# Patient Record
Sex: MALE | Race: OTHER | HISPANIC OR LATINO | Employment: UNEMPLOYED | URBAN - METROPOLITAN AREA
[De-identification: names, ages, dates, MRNs, and addresses within clinical notes are randomized per-mention and may not be internally consistent; named-entity substitution may affect disease eponyms.]

---

## 2017-01-16 ENCOUNTER — OFFICE VISIT (OUTPATIENT)
Dept: URGENT CARE | Facility: MEDICAL CENTER | Age: 47
End: 2017-01-16
Payer: COMMERCIAL

## 2017-01-16 PROCEDURE — G0383 LEV 4 HOSP TYPE B ED VISIT: HCPCS

## 2017-01-16 PROCEDURE — 94640 AIRWAY INHALATION TREATMENT: CPT

## 2017-08-07 ENCOUNTER — HOSPITAL ENCOUNTER (EMERGENCY)
Facility: HOSPITAL | Age: 47
Discharge: HOME | End: 2017-08-07
Admitting: EMERGENCY MEDICINE

## 2017-08-07 VITALS
TEMPERATURE: 97.9 F | DIASTOLIC BLOOD PRESSURE: 85 MMHG | HEART RATE: 81 BPM | RESPIRATION RATE: 16 BRPM | SYSTOLIC BLOOD PRESSURE: 128 MMHG | OXYGEN SATURATION: 97 %

## 2017-08-07 DIAGNOSIS — M54.5 BILATERAL LOW BACK PAIN, UNSPECIFIED CHRONICITY, WITH SCIATICA PRESENCE UNSPECIFIED: Primary | ICD-10-CM

## 2017-08-07 PROCEDURE — 99283 EMERGENCY DEPT VISIT LOW MDM: CPT

## 2017-08-07 RX ORDER — METHYLPREDNISOLONE 4 MG/1
TABLET ORAL
Qty: 21 TABLET | Refills: 0 | Status: SHIPPED | OUTPATIENT
Start: 2017-08-07 | End: 2018-10-13

## 2017-08-07 RX ORDER — CYCLOBENZAPRINE HCL 5 MG
TABLET ORAL
Qty: 12 TABLET | Refills: 0 | Status: SHIPPED | OUTPATIENT
Start: 2017-08-07 | End: 2018-10-13

## 2017-08-07 RX ORDER — LIDOCAINE 50 MG/G
1 PATCH TOPICAL ONCE
Status: DISCONTINUED | OUTPATIENT
Start: 2017-08-07 | End: 2017-08-07 | Stop reason: HOSPADM

## 2017-08-07 RX ADMIN — LIDOCAINE 1 PATCH: 50 PATCH CUTANEOUS at 07:35

## 2017-08-15 ENCOUNTER — ALLSCRIPTS OFFICE VISIT (OUTPATIENT)
Dept: OTHER | Facility: OTHER | Age: 47
End: 2017-08-15

## 2017-08-24 ENCOUNTER — ALLSCRIPTS OFFICE VISIT (OUTPATIENT)
Dept: OTHER | Facility: OTHER | Age: 47
End: 2017-08-24

## 2017-09-25 ENCOUNTER — GENERIC CONVERSION - ENCOUNTER (OUTPATIENT)
Dept: OTHER | Facility: OTHER | Age: 47
End: 2017-09-25

## 2017-10-02 ENCOUNTER — GENERIC CONVERSION - ENCOUNTER (OUTPATIENT)
Dept: FAMILY MEDICINE CLINIC | Facility: CLINIC | Age: 47
End: 2017-10-02

## 2017-10-04 ENCOUNTER — GENERIC CONVERSION - ENCOUNTER (OUTPATIENT)
Dept: OTHER | Facility: OTHER | Age: 47
End: 2017-10-04

## 2017-11-10 ENCOUNTER — GENERIC CONVERSION - ENCOUNTER (OUTPATIENT)
Dept: OTHER | Facility: OTHER | Age: 47
End: 2017-11-10

## 2017-11-20 ENCOUNTER — GENERIC CONVERSION - ENCOUNTER (OUTPATIENT)
Dept: OTHER | Facility: OTHER | Age: 47
End: 2017-11-20

## 2017-11-29 ENCOUNTER — GENERIC CONVERSION - ENCOUNTER (OUTPATIENT)
Dept: OTHER | Facility: OTHER | Age: 47
End: 2017-11-29

## 2017-12-07 ENCOUNTER — GENERIC CONVERSION - ENCOUNTER (OUTPATIENT)
Dept: OTHER | Facility: OTHER | Age: 47
End: 2017-12-07

## 2017-12-07 DIAGNOSIS — M25.511 PAIN IN RIGHT SHOULDER: ICD-10-CM

## 2018-01-12 VITALS
OXYGEN SATURATION: 98 % | SYSTOLIC BLOOD PRESSURE: 112 MMHG | HEIGHT: 65 IN | HEART RATE: 94 BPM | WEIGHT: 144.06 LBS | DIASTOLIC BLOOD PRESSURE: 80 MMHG | RESPIRATION RATE: 18 BRPM | BODY MASS INDEX: 24 KG/M2 | TEMPERATURE: 96.4 F

## 2018-01-12 NOTE — MISCELLANEOUS
Reason For Visit  Reason For Visit Free Text Note Form: SW met with the PT in regards to his current needs  PT states he has lost his employment and currently has no income  SW gave the PT information to go directly to the [facility] office to apply for Medical Assistance and Food stamps  SW provided PT list of what is needed for this application  PT will follow-up with SW for future assistance if needed  Active Problems    1  Acute sinusitis (461 9) (J01 90)   2  Bilateral carpal tunnel syndrome (354 0) (G56 03)   3  Cough (786 2) (R05)   4  Depression (311) (F32 9)   5  Depression screening (V79 0) (Z13 89)   6  Generalized abdominal pain (789 07) (R10 84)   7  GERD (gastroesophageal reflux disease) (530 81) (K21 9)   8  Hyperlipidemia (272 4) (E78 5)   9  Increased frequency of urination (788 41) (R35 0)   10  Increased thirst (783 5) (R63 1)   11  Insomnia (780 52) (G47 00)   12  Loose stools (787 7) (R19 5)   13  Lower back pain (724 2) (M54 5)   14  Lumbar radiculopathy (724 4) (M54 16)   15  Pain in both wrists (719 43) (M25 531,M25 532)   16  Vitamin D deficiency (268 9) (E55 9)    Current Meds   1  Imodium A-D 2 MG Oral Tablet; TAKE 2 TABLETS INITIALLY, FOLLOWED BY 1 TABLET   AFTER EACH LOOSE BOWEL MOVEMENT  DO NOT EXCEED 8 TABLETS/DAY; Therapy: 14SLI2034 to (Last Rx:20Nov2017) Ordered   2  Omeprazole 20 MG Oral Tablet Delayed Release; TAKE ONE TABLET AT BEDTIME; Therapy: 47UTW2736 to (Last Rx:20Nov2017) Ordered   3  Sertraline HCl - 100 MG Oral Tablet; TAKE 1 TABLET DAILY; Therapy: 17Nme5712 to (Evaluate:31Mar2018)  Requested for: 02Oct2017; Last   Rx:02Oct2017 Ordered   4  Zolpidem Tartrate 10 MG Oral Tablet (Ambien); TAKE 1 TABLET AT BEDTIME AS NEEDED   FOR SLEEP; Therapy: 72Brf2780 to (Evaluate:67Jux6719); Last Rx:60Vag4667 Ordered    Allergies    1   No Known Drug Allergies    Signatures   Electronically signed by : Brigid Isaac; Nov 29 2017 10:57AM EST (Author)

## 2018-01-12 NOTE — MISCELLANEOUS
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 and cannot afford what is offered through the [employer]  SW discussed the possibility of applying for MAWD (Medical Assistance for Workers with Disabilities), which carries a higher income limit  PT is going to return on 10/11/17 to complete MAWD application and supply application with the required documentation  Active Problems    1  Bilateral carpal tunnel syndrome (354 0) (G56 03)   2  Depression (311) (F32 9)   3  Depression screening (V79 0) (Z13 89)   4  GERD (gastroesophageal reflux disease) (530 81) (K21 9)   5  Hyperlipidemia (272 4) (E78 5)   6  Increased frequency of urination (788 41) (R35 0)   7  Increased thirst (783 5) (R63 1)   8  Insomnia (780 52) (G47 00)   9  Lower back pain (724 2) (M54 5)   10  Lumbar radiculopathy (724 4) (M54 16)   11  Pain in both wrists (719 43) (M25 531,M25 532)   12  Vitamin D deficiency (268 9) (E55 9)    Current Meds   1  Sertraline HCl - 100 MG Oral Tablet; TAKE 1 TABLET DAILY; Therapy: 49Lcn1629 to (Evaluate:31Mar2018)  Requested for: 02Oct2017; Last   Rx:02Oct2017 Ordered   2  Zolpidem Tartrate 10 MG Oral Tablet (Ambien); TAKE 1 TABLET AT BEDTIME AS NEEDED   FOR SLEEP; Therapy: 42Gwi4002 to (Last Rx:81Kqa2450) Ordered    Allergies    1   No Known Drug Allergies    Signatures   Electronically signed by : Trish Naylor [facility]; Oct  6 2017 10:44AM EST                       (Author)

## 2018-01-12 NOTE — PROGRESS NOTES
Assessment    1  Bilateral carpal tunnel syndrome (354 0) (G56 01,G56 02)   2  Subacromial bursitis (726 19) (M75 50)    Plan  Subacromial bursitis    · Apply an ice pack as needed for pain twice a day for 20 minutes ; Status:Complete;    Done: 51QRF6708   · Follow-up PRN Evaluation and Treatment  Follow-up  Status: Complete  Done:  23CHC6514   · Home Exercise Program Evaluation and Treatment  Follow-up  Status: Complete  Done:  10KIU9268    Discussion/Summary    Assessment  #1 status post bilateral carpal tunnel releases  #2 bilateral shoulder subacromial bursitis  #3 lumbago    Plan  #1  He will continue home stretching and strengthening exercises for his rotator cuff  I reviewed a home exercises for a lumbar program   #2 ice, anti-inflammatory currently using diclofenac  #3 follow-up when necessary call if symptoms worsen  The treatment plan was reviewed with the patient/guardian  The patient/guardian understands and agrees with the treatment plan      Chief Complaint    1  Hand Problem    History of Present Illness  HPI: 79-year-old male here for follow-up from his right and left endoscopic carpal tunnel release on 11/16/15 and 12/10/15 respectively  Since the last visit he had been participating in home rotator cuff strengthening exercises  He had also had a fall onto his left side on an outstretched hand  He complains of tingling in the posterior aspect of the left hand involving all the digits  He also complains of low back pain  I have personally reviewed and updated the patient's past medical history, past surgical history, family history, social history, current medications, allergies, and vital signs today  Review of Systems    Constitutional: No fever or chills, feels well, no tiredness, no recent weight loss or weight gain  Eyes: No complaints of red eyes, no eyesight problems  ENT: no complaints of loss of hearing, no nosebleeds, no sore throat     Cardiovascular: No complaints of chest pain, no palpitations, no leg claudication or lower extremity edema  Respiratory: No complaints of shortness of breath, no wheezing, no cough  Gastrointestinal: No complaints of abdominal pain, no constipation, no nausea or vomiting, no diarrhea or bloody stools  Genitourinary: No complaints of dysuria or incontinence, no hesitancy, no nocturia  Musculoskeletal: as noted in HPI  Integumentary: No complaints of skin rash or lesion, no itching or dry skin, no skin wounds  Neurological: No complaints of headache, no confusion, no numbness or tingling, no dizziness  Psychiatric: No suicidal thoughts, no anxiety, no depression  Endocrine: No muscle weakness, no frequent urination, no excessive thirst, no feelings of weakness  ROS reviewed  Active Problems    1  Acute upper respiratory infection (465 9) (J06 9)   2  Bilateral carpal tunnel syndrome (354 0) (G56 01,G56 02)   3  Bilateral hand numbness (782 0) (R20 0)   4  Chest pain (786 50) (R07 9)   5  Depression (311) (F32 9)   6  Fatigue (780 79) (R53 83)   7  Generalized anxiety disorder (300 02) (F41 1)   8  Hyperlipidemia (272 4) (E78 5)   9  Lower back pain (724 2) (M54 5)   10  Nummular dermatitis (692 9) (L30 0)   11  Pain in both wrists (719 43) (M25 531,M25 532)   12  Subacromial bursitis (726 19) (M75 50)   13  Vitamin D deficiency (268 9) (E55 9)   14  Wrist pain (719 43) (M25 539)    Current Meds   1  Atorvastatin Calcium 20 MG Oral Tablet; TAKE 1 TABLET DAILY AT BEDTIME; Therapy: 86CNY4891 to (Evaluate:70Mft4996); Last Rx:21Jan2014 Ordered   2  Gabapentin 300 MG Oral Capsule; Take 1 capsule at night for 3 days, then take 1   capsule in morning and night x 3 days, then use every 8 hours; Therapy: 26UJZ1825 to (Evaluate:74Rfl6851)  Requested for: 98FVC6555; Last   Rx:25Jun2015 Ordered   3  Hydrocortisone 2 5 % External Cream; APPLY SPARINGLY TO AFFECTED AREA(S) 2   TO 3 TIMES DAILY   DO NOT USE ON GENITAL AREA, FACE OR BREASTS; Therapy: 46DQA5455 to (Last Rx:77Mdv0842) Ordered   4  Meloxicam 15 MG Oral Tablet; TAKE 1 TABLET DAILY AS NEEDED; Therapy: 20Gwo1999 to (Evaluate:23Jun2014)  Requested for: 60Rum2916; Last   Rx:33Qhh9306 Ordered   5  Metaxalone 800 MG Oral Tablet; TAKE 1 TABLET 3 TIMES DAILY AS NEEDED FOR   MUSCLE SPASM; Therapy: 65Hea1980 to (Juana Carlisle)  Requested for: 24Apr2014; Last   Rx:24Apr2014 Ordered   6  Multiple Vitamins Oral Tablet; Therapy: 83QDB2314 to Recorded   7  PARoxetine HCl - 20 MG Oral Tablet; take 1 tablet q hs;   Therapy: 18UOO4979 to (Evaluate:18Jan2014); Last Rx:31Kor4715 Ordered   8  TraZODone HCl - 50 MG Oral Tablet; TAKE 1/2 - 4 TABLETS AT BEDTIME AS NEEDED   FOR SLEEP; Therapy: 26NES1442 to (Last Rx:05Pfw7922) Ordered   9  Vitamin D (Ergocalciferol) 09572 UNIT Oral Capsule; TAKE 1 CAPSULE WEEKLY; Therapy: 92FRO6396 to (Last Rx:37Wpu1039) Ordered    Allergies    1  No Known Drug Allergies    Vitals  Signs [Data Includes: Current Encounter]    Heart Rate: 77  Systolic: 694  Diastolic: 79  Height: 5 ft 4 in  Weight: 145 lb 2 08 oz  BMI Calculated: 24 91  BSA Calculated: 1 72    Physical Exam  (Full range of motion intact to the bilateral shoulders  Rotator cuff strength 5 out of 5 in all fields minimally tender to the subacromial bursa  Positive Neer, negative Vargas)    (Negative Tinel at the median and ulnar nerve  Negative carpal and cubital tunnel compression incisions are well-healed    Full wrist range of motion is intact)    (Negative Spurling)      Constitutional - General appearance: Normal    Musculoskeletal - Gait and station: Normal  Digits and nails: Normal  Muscle strength/tone: Normal    Cardiovascular - Pulses: Normal  Examination of extremities for edema and/or varicosities: Normal    Skin - Skin and subcutaneous tissue: Normal    Neurologic - Sensation: Normal    Psychiatric - Orientation to person, place, and time: Normal  Mood and affect: Normal    Eyes Conjunctiva and lids: Normal     Pupils and irises: Normal        Attending Note  Collaborating Physician Note: Collaborating Physician: I interviewed and examined the patient, I supervised the Advanced Practitioner and I agree with the Advanced Practitioner note        Signatures   Electronically signed by : Henretta Aschoff, [facility]; Feb 2 2016  4:32PM EST                       (Author)    Electronically signed by : JOSEMANUEL Stoddard ; Feb 2 2016  4:58PM EST                       (Author)

## 2018-01-13 VITALS
SYSTOLIC BLOOD PRESSURE: 120 MMHG | BODY MASS INDEX: 24.32 KG/M2 | HEIGHT: 65 IN | DIASTOLIC BLOOD PRESSURE: 78 MMHG | RESPIRATION RATE: 20 BRPM | TEMPERATURE: 97.2 F | WEIGHT: 146 LBS | OXYGEN SATURATION: 99 % | HEART RATE: 91 BPM

## 2018-01-13 NOTE — MISCELLANEOUS
Message  Left message for patient regarding lab results essentially normal  To call back or schedule follow up visit if he has started omeprazole and is still not feeling well        Signatures   Electronically signed by : Cynthia Wolfe, [address]; Feb 25 2016 10:33AM EST                       (Author)

## 2018-01-15 NOTE — MISCELLANEOUS
Message  Return to work or school:   Mateo Hanley is under my professional care  He was seen in my office on 2/2/16     He can perform work without limitations  Jacqueline Hoffmann PA-C        Signatures   Electronically signed by : José Rodriguez, [facility]; Feb  3 2016  1:23PM EST                       (Author)

## 2018-01-16 ENCOUNTER — APPOINTMENT (OUTPATIENT)
Dept: RADIOLOGY | Facility: CLINIC | Age: 48
End: 2018-01-16
Payer: COMMERCIAL

## 2018-01-16 ENCOUNTER — GENERIC CONVERSION - ENCOUNTER (OUTPATIENT)
Dept: OTHER | Facility: OTHER | Age: 48
End: 2018-01-16

## 2018-01-16 DIAGNOSIS — M54.2 CERVICALGIA: ICD-10-CM

## 2018-01-16 DIAGNOSIS — M25.511 PAIN IN RIGHT SHOULDER: ICD-10-CM

## 2018-01-16 PROCEDURE — 72040 X-RAY EXAM NECK SPINE 2-3 VW: CPT

## 2018-01-16 NOTE — MISCELLANEOUS
Please contact our office at your convenience  It is important that we speak to you  REGARDING:   Porfavor contacte a nuestra oficina  Es muy  importante que hablemos con usted   SOBRE:         X Scheduling an Appointment/ Programar jaylon Alise          Rescheduling an Appointment/ Re-programar  jaylon Alise     Cancelling an Appointment/Cancelar wahl Alise     Your Lab/ X-ray Results/Resultados         Updating your Phone number or Address/ Actualizar wahl [employer]            Verify your Primary Providor/ Verificar wahl Proveedor primario     Other/Otro:    Please Contact Our Office to Schedule Your Appointment  It  is Very Important That You See Your Provider for your  Routine Medical Care  If you are seeing a New Providor,  Please Contact our Office so we can update our Records  Thank You  Comuníquese con nuestra oficina para programar wahl alise  Es Muy Importante que usted ilana wahl proveedor  para wahl   [address] [facility] Floor de [place]  Si usted esta viendo un Proveedor  [place], [place] con Rolle Johan oficina para actualizar   nuestro registros  Zoë

## 2018-01-18 NOTE — MISCELLANEOUS
Message  Return to work or school:   Hay Skinner is under my professional care  He was seen in my office on August 13, 2016   He is able to return to work on  August 16, 2016            Signatures   Electronically signed by : Roro Daley, [facility];  Aug 13 2016  9:07AM EST                       (Author)

## 2018-01-22 VITALS
HEIGHT: 65 IN | SYSTOLIC BLOOD PRESSURE: 122 MMHG | TEMPERATURE: 97.2 F | WEIGHT: 142.13 LBS | BODY MASS INDEX: 23.68 KG/M2 | HEART RATE: 84 BPM | DIASTOLIC BLOOD PRESSURE: 86 MMHG | OXYGEN SATURATION: 99 % | RESPIRATION RATE: 18 BRPM

## 2018-01-22 VITALS
RESPIRATION RATE: 18 BRPM | HEIGHT: 65 IN | OXYGEN SATURATION: 99 % | BODY MASS INDEX: 23.82 KG/M2 | DIASTOLIC BLOOD PRESSURE: 80 MMHG | SYSTOLIC BLOOD PRESSURE: 120 MMHG | WEIGHT: 143 LBS | TEMPERATURE: 97.5 F | HEART RATE: 86 BPM

## 2018-01-22 VITALS
HEIGHT: 65 IN | SYSTOLIC BLOOD PRESSURE: 118 MMHG | WEIGHT: 136 LBS | DIASTOLIC BLOOD PRESSURE: 84 MMHG | BODY MASS INDEX: 22.66 KG/M2 | HEART RATE: 98 BPM | RESPIRATION RATE: 18 BRPM | TEMPERATURE: 97.1 F

## 2018-01-24 VITALS
OXYGEN SATURATION: 98 % | HEIGHT: 65 IN | BODY MASS INDEX: 24.24 KG/M2 | HEART RATE: 118 BPM | SYSTOLIC BLOOD PRESSURE: 118 MMHG | WEIGHT: 145.5 LBS | TEMPERATURE: 97.5 F | DIASTOLIC BLOOD PRESSURE: 78 MMHG | RESPIRATION RATE: 16 BRPM

## 2018-01-24 VITALS — DIASTOLIC BLOOD PRESSURE: 86 MMHG | SYSTOLIC BLOOD PRESSURE: 123 MMHG | HEART RATE: 99 BPM

## 2018-01-25 ENCOUNTER — EVALUATION (OUTPATIENT)
Dept: PHYSICAL THERAPY | Facility: CLINIC | Age: 48
End: 2018-01-25
Payer: COMMERCIAL

## 2018-01-25 DIAGNOSIS — M25.511 CHRONIC RIGHT SHOULDER PAIN: Primary | ICD-10-CM

## 2018-01-25 DIAGNOSIS — M25.512 CHRONIC LEFT SHOULDER PAIN: ICD-10-CM

## 2018-01-25 DIAGNOSIS — G89.29 CHRONIC LEFT SHOULDER PAIN: ICD-10-CM

## 2018-01-25 DIAGNOSIS — G89.29 CHRONIC RIGHT SHOULDER PAIN: Primary | ICD-10-CM

## 2018-01-25 PROCEDURE — 97162 PT EVAL MOD COMPLEX 30 MIN: CPT | Performed by: PHYSICAL THERAPIST

## 2018-01-25 PROCEDURE — G8991 OTHER PT/OT GOAL STATUS: HCPCS | Performed by: PHYSICAL THERAPIST

## 2018-01-25 PROCEDURE — G8990 OTHER PT/OT CURRENT STATUS: HCPCS | Performed by: PHYSICAL THERAPIST

## 2018-01-25 NOTE — LETTER
2018    Brandon Baez MD  [address] [place] [address]    Patient: Astrid Rodriguez   YOB: 1970   Date of Visit: 2018       Dear Dr Roe Sierra:    Please review the attached summary of [facility] Jose's progress and our plan for continued therapy, and verify that you agree therapy should continue by signing the attached document and sending it back to our office  If you have any questions or concerns, please don't hesitate to call  Sincerely,        Michele Austin PT          CC: No Recipients            PT Evaluation     Today's date: 2018  Patient name: Astrid Rodriguez  : 1970  MRN: [id number]  Referring provider: Junaid Loaiza MD  Dx:   Encounter Diagnosis   Name Primary? 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  Notes he recently consulted orthopedic physician, who referred him to therapy  Denies any treatments or therapy in the past for this condition  Reports history of carpal tunnel releases about 2-years ago, however, notes he has been having pain into left hand during the night and into right fingers 1-3 when working with tools  Notes his hands turn purple when shoveling snow or when taking cold shower     Pain  Current pain ratin  At best pain ratin  At worst pain ratin  Location: right and left UT region, superior right and left shoulder,   Quality: burning and sharp  Relieving factors: change in position and medications  Aggravating factors: eating, overhead activity and lifting  Progression: worsening      Diagnostic Tests  X-ray: normal  Treatments  Previous treatment: medication        Objective     Active Range of Motion   Cervical/Thoracic Spine   Cervical    Flexion: 72 degrees   Extension: 50 degrees   Left lateral flexion: 40 degrees   Right lateral flexion: 35 degrees   Left rotation: 60 degrees   Right rotation: 52 degrees   Left Shoulder   Flexion: 150 degrees   Abduction: 140 degrees   External rotation BTH: T4   Internal rotation BTB: T9     Right Shoulder   Flexion: 153 degrees   Abduction: 135 degrees   External rotation BTH: T4   Internal rotation BTB: T11       Precautions: ***    Daily Treatment Diary     Manual                                                                                   Exercise Diary                                                                                                                                                                                                                                                                                      Modalities                                                                              Based upon review of the patient's progress and continued therapy plan, it is my medical opinion that Morales Chaka should continue physical therapy treatment at the Physical Therapy [address]:

## 2018-01-25 NOTE — LETTER
2018    Kunal Barrera MD  [address] [place] [address] [address]  [address] [place] [address]    Patient: Kerry Brown   YOB: 1970   Date of Visit: 2018       Dear Dr Cathy Ellsworth:    Please review the attached summary of [facility] Jose's progress and our plan for continued therapy, and verify that you agree therapy should continue by signing the attached document and sending it back to our office  If you have any questions or concerns, please don't hesitate to call  Sincerely,        Gaurav Fenton PT          CC: No Recipients            PT Evaluation     Today's date: 2018  Patient name: Kerry Brown  : 1970  MRN: [id number]  Referring provider: Rosamaria Aparicio MD  Dx:   Encounter Diagnosis   Name Primary?  Pain in right shoulder                   Assessment  Impairments: abnormal coordination and scapular dyskinesis    Assessment details: Pt is a 41 y o  male presenting to outpatient physical therapy with Pain in right shoulder   Pt presents with pain, scapular dyskinesia, and decreased tolerance to activity  However, patient demonstrates good ROM, strength, cervical active and passive mobility, and negative s/sx's of cervical radiculopathy at present time  Pt would benefit from skilled physical therapy to address limitations and to achieve goals  However, patient expresses concern over high copay ($50/session) and states he would be unable to afford this  Offered private pay option, however, states he fears he still would not be able to afford cost  Therefore, patient given home exercise program and will contact patient in 1-2 weeks to check progress  Thank you for this referral      Understanding of Dx/Px/POC: good   Prognosis: fair  Prognosis details: Pt scores high on PHQ-9 Depression screening   Therefore, discussed with patient considering consult with psych, however, patient declines referral at this time as he states he has not felt need for depression medication over the past 3-months, expresses no concerns over depression, and does not feel this is affecting his pain levels at present time  Patient still advised to consult PCP, at least, regarding matter  Plan  Patient would benefit from: PT chasal and skilled PT      Subjective Evaluation    History of Present Illness  Mechanism of injury: Pt reports several year history of bilateral shoulder pain, however, reports worsening pain over the past 2-3 months of unknown reason  Reports pain began insidiously several years ago  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[employer] , 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, 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  Notes he recently consulted orthopedic physician, who referred him to therapy  Denies any treatments or therapy in the past for this condition  Reports history of carpal tunnel releases about 2-years ago, however, notes he has been having pain recently into left hand during the night and into right fingers 1-3 when working with tools  Notes his hands turn purple when shoveling snow or when taking cold shower  Denies numbness, tingling, or paresthesias at present time  Follow up with physician in 6 weeks  Pain  Current pain ratin  At best pain ratin  At worst pain ratin  Location: right and left UT region, superior right and left shoulder  Quality: burning and sharp  Relieving factors: change in position and medications  Aggravating factors: eating, overhead activity and lifting  Progression: worsening    Social Support    Employment status: working    Diagnostic Tests  X-ray: normal (c-spine)  Treatments  Previous treatment: medication        Objective     Postural Observations  Seated posture: fair  Standing posture: fair        Palpation   Left   No palpable tenderness to the cervical interspinals, cervical paraspinals, deltoid, levator scapulae, lower trapezius, middle trapezius, rhomboids and upper trapezius  Tenderness of the upper trapezius       Right   No palpable tenderness to the cervical interspinals, cervical paraspinals, deltoid, levator scapulae, middle trapezius and rhomboids  Tenderness of the upper trapezius       Neurological Testing     Reflexes   Left   Biceps (C5/C6): brisk (3+)  Brachioradialis (C6): normal (2+)  Triceps (C7): normal (2+)  Magdaleno's reflex: negative    Right   Biceps (C5/C6): brisk (3+)  Brachioradialis (C6): normal (2+)  Triceps (C7): normal (2+)  Magdaleno's reflex: negative    Additional Neurological Details  UPPER EXTREMITY DERMATOMES: Intact sensations and symmetrical bilat C2-T1  UPPER EXTREMITY MYOTOMES: Intact and symmetrical bilat C2-T1      Active Range of Motion   Cervical/Thoracic Spine   Cervical    Flexion: 72 degrees   Extension: 50 degrees   Left lateral flexion: 40 degrees   Right lateral flexion: 35 degrees   Left rotation: 60 degrees   Right rotation: 52 degrees   Left Shoulder   Flexion: 150 degrees   Abduction: 140 degrees   External rotation BTH: T4   Internal rotation BTB: T9     Right Shoulder   Flexion: 153 degrees   Abduction: 135 degrees   External rotation BTH: T4   Internal rotation BTB: T11     Additional Active Range of Motion Details  Denies pain with AROM testing  Mild discomfort with shoulder flex and abduction bilat    Passive Range of Motion   Cervical/Thoracic Spine   Cervical   Left lateral flexion: WFL  Right lateral flexion: WFL  Left rotation: WFL  Right rotation: [facility]  Left Shoulder   Normal passive range of motion    Right Shoulder   Normal passive range of motion    Additional Passive Range of Motion Details  Upper cervical mobility (C1-2) WFL and negative for pain  Denies pain    Scapular Mobility   Left Shoulder   Scapular Dyskinesis: none  Scapular mobility: WFL    Right Shoulder   Scapular Dyskinesis: grade I and grade II    Scapular Mobility beyond 90° FF   Scapular winging: minimal  Scapular elevation: moderate  Upward rotation: excessive    Joint Play Joints within functional limits: C2, C3, C4, C5, C6 and C7 Hypomobile: C7, T1, T2, T3, T4, T5, T6, T7 and T8   Comments: Hypomobility as noted, however, negative for pain or symptom reproduction    Strength/Myotome Testing     Left Shoulder   Normal muscle strength    Right Shoulder   Normal muscle strength    Tests   Cervical     Left   Negative cervical distraction and Spurling's sign  Right   Negative cervical distraction and Spurling's sign  Left Shoulder   Negative active compression ([place]), drop arm, external rotation lag sign, Hawkin's, Neer's, painful arc, ULTT1, ULTT3, ULTT4, scapular relocation and scapular assistance   Right Shoulder   Negative active compression ([place]), drop arm, external rotation lag sign, Hawkin's, Neer's, painful arc, ULTT1, ULTT3, ULTT4, scapular relocation and scapular assistance          Precautions: anxiety, depression, GI disease    Daily Treatment Diary     Manual              Scap mobs                                                                     Exercise Diary              UBE             Scap retractions             TB sh ext             TB bilat ER             Wall slides with 1/2 roll             Wall push-plus                          Prone I, T, Y             Prone row             Quad serratus press                                                                                                                                                   Modalities                                                                           Based upon review of the patient's progress and continued therapy plan, it is my medical opinion that Maddy Quiles should continue physical therapy treatment at the Physical Therapy at [address] [place] [address] Drive:

## 2018-01-25 NOTE — PROGRESS NOTES
PT Evaluation     Today's date: 2018  Patient name: Perla Adhikari  : 1970  MRN: [id number]  Referring provider: Natividad Stack MD  Dx:   Encounter Diagnosis   Name Primary?  Pain in right shoulder                   Assessment  Impairments: abnormal coordination and scapular dyskinesis    Assessment details: Pt is a 41 y o  male presenting to outpatient physical therapy with Pain in right shoulder   Pt presents with pain, scapular dyskinesia, and decreased tolerance to activity  However, patient demonstrates good ROM, strength, cervical active and passive mobility, and negative s/sx's of cervical radiculopathy at present time  Pt would benefit from skilled physical therapy to address limitations and to achieve goals  However, patient expresses concern over high copay ($50/session) and states he would be unable to afford this  Offered private pay option, however, states he fears he still would not be able to afford cost  Therefore, patient given home exercise program and will contact patient in 1-2 weeks to check progress  Thank you for this referral      Understanding of Dx/Px/POC: good   Prognosis: fair  Prognosis details: Pt scores high on PHQ-9 Depression screening  Therefore, discussed with patient considering consult with psych, however, patient declines referral at this time as he states he has not felt need for depression medication over the past 3-months, expresses no concerns over depression, and does not feel this is affecting his pain levels at present time  Patient still advised to consult PCP, at least, regarding matter  Plan  Patient would benefit from: PT eval and skilled PT      Subjective Evaluation    History of Present Illness  Mechanism of injury: Pt reports several year history of bilateral shoulder pain, however, reports worsening pain over the past 2-3 months of unknown reason  Reports pain began insidiously several years ago   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[employer], 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, 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  Notes he recently consulted orthopedic physician, who referred him to therapy  Denies any treatments or therapy in the past for this condition  Reports history of carpal tunnel releases about 2-years ago, however, notes he has been having pain recently into left hand during the night and into right fingers 1-3 when working with tools  Notes his hands turn purple when shoveling snow or when taking cold shower  Denies numbness, tingling, or paresthesias at present time  Follow up with physician in 6 weeks  Pain  Current pain ratin  At best pain ratin  At worst pain ratin  Location: right and left UT region, superior right and left shoulder  Quality: burning and sharp  Relieving factors: change in position and medications  Aggravating factors: eating, overhead activity and lifting  Progression: worsening    Social Support    Employment status: working    Diagnostic Tests  X-ray: normal (c-spine)  Treatments  Previous treatment: medication        Objective     Postural Observations  Seated posture: fair  Standing posture: fair        Palpation   Left   No palpable tenderness to the cervical interspinals, cervical paraspinals, deltoid, levator scapulae, lower trapezius, middle trapezius, rhomboids and upper trapezius  Tenderness of the upper trapezius  Right   No palpable tenderness to the cervical interspinals, cervical paraspinals, deltoid, levator scapulae, middle trapezius and rhomboids  Tenderness of the upper trapezius       Neurological Testing     Reflexes   Left   Biceps (C5/C6): brisk (3+)  Brachioradialis (C6): normal (2+)  Triceps (C7): normal (2+)  Magdaleno's reflex: negative    Right   Biceps (C5/C6): brisk (3+)  Brachioradialis (C6): normal (2+)  Triceps (C7): normal (2+)  Magdaleno's reflex: negative    Additional Neurological Details  UPPER EXTREMITY DERMATOMES: Intact sensations and symmetrical bilat C2-T1  UPPER EXTREMITY MYOTOMES: Intact and symmetrical bilat C2-T1      Active Range of Motion   Cervical/Thoracic Spine   Cervical    Flexion: 72 degrees   Extension: 50 degrees   Left lateral flexion: 40 degrees   Right lateral flexion: 35 degrees   Left rotation: 60 degrees   Right rotation: 52 degrees   Left Shoulder   Flexion: 150 degrees   Abduction: 140 degrees   External rotation BTH: T4   Internal rotation BTB: T9     Right Shoulder   Flexion: 153 degrees   Abduction: 135 degrees   External rotation BTH: T4   Internal rotation BTB: T11     Additional Active Range of Motion Details  Denies pain with AROM testing  Mild discomfort with shoulder flex and abduction bilat    Passive Range of Motion   Cervical/Thoracic Spine   Cervical   Left lateral flexion: WFL  Right lateral flexion: WFL  Left rotation: WFL  Right rotation: [facility]  Left Shoulder   Normal passive range of motion    Right Shoulder   Normal passive range of motion    Additional Passive Range of Motion Details  Upper cervical mobility (C1-2) WFL and negative for pain  Denies pain    Scapular Mobility   Left Shoulder   Scapular Dyskinesis: none  Scapular mobility: WFL    Right Shoulder   Scapular Dyskinesis: grade I and grade II    Scapular Mobility beyond 90° FF   Scapular winging: minimal  Scapular elevation: moderate  Upward rotation: excessive    Joint Play Joints within functional limits: C2, C3, C4, C5, C6 and C7 Hypomobile: C7, T1, T2, T3, T4, T5, T6, T7 and T8   Comments: Hypomobility as noted, however, negative for pain or symptom reproduction    Strength/Myotome Testing     Left Shoulder   Normal muscle strength    Right Shoulder   Normal muscle strength    Tests   Cervical     Left   Negative cervical distraction and Spurling's sign  Right   Negative cervical distraction and Spurling's sign       Left Shoulder   Negative active compression ([place]), drop arm, external rotation lag sign, Hawkin's, Neer's, painful arc, ULTT1, ULTT3, ULTT4, scapular relocation and scapular assistance   Right Shoulder   Negative active compression ([place]), drop arm, external rotation lag sign, Hawkin's, Neer's, painful arc, ULTT1, ULTT3, ULTT4, scapular relocation and scapular assistance          Precautions: anxiety, depression, GI disease    Daily Treatment Diary     Manual              Scap mobs                                                                     Exercise Diary              UBE             Scap retractions             TB sh ext             TB bilat ER             Wall slides with 1/2 roll             Wall push-plus                          Prone I, T, Y             Prone row             Quad serratus press                                                                                                                                                   Modalities

## 2018-01-26 NOTE — PROGRESS NOTES
I have reviewed the notes, assessments, and/or procedures performed by Abraham Deleon, I concur with her/his documentation of Perla Adhikari

## 2018-02-20 NOTE — PROGRESS NOTES
PT Discharge    Today's date: 2018  Patient name: Abdiaziz Garrido  : 1970  MRN: [id number]  Referring provider: Murray Amin MD  Dx:   Encounter Diagnosis     ICD-10-CM    1  Chronic right shoulder pain M25 511  Physical Therapy    G89 29    2  Chronic left shoulder pain M25 512     G89 29        Start Time: 1700  Stop Time: 1750  Total time in clinic (min): 50 minutes    Assessment    Assessment details: Measurements in Discharge summary reflect those taken at [facility] due to being lost to follow up          Subjective    Objective    Flowsheet Rows    Flowsheet Row Most Recent Value   PT/OT G-Codes   Current Score  43   Projected Score  63   FOTO information reviewed  No   Assessment Type  Evaluation   G code set  Other PT Primary   Other PT Primary Current Status ()  CK   Other PT Primary Goal Status ()  CJ

## 2018-02-27 ENCOUNTER — HOSPITAL ENCOUNTER (EMERGENCY)
Facility: HOSPITAL | Age: 48
Discharge: HOME | End: 2018-02-27
Attending: EMERGENCY MEDICINE | Admitting: EMERGENCY MEDICINE
Payer: COMMERCIAL

## 2018-02-27 VITALS
OXYGEN SATURATION: 99 % | HEART RATE: 85 BPM | SYSTOLIC BLOOD PRESSURE: 134 MMHG | RESPIRATION RATE: 18 BRPM | WEIGHT: 148 LBS | BODY MASS INDEX: 24.63 KG/M2 | DIASTOLIC BLOOD PRESSURE: 90 MMHG | TEMPERATURE: 98.2 F

## 2018-02-27 DIAGNOSIS — G89.29 CHRONIC ABDOMINAL PAIN: Primary | ICD-10-CM

## 2018-02-27 DIAGNOSIS — R11.0 NAUSEA: ICD-10-CM

## 2018-02-27 DIAGNOSIS — R10.9 CHRONIC ABDOMINAL PAIN: Primary | ICD-10-CM

## 2018-02-27 LAB
ALBUMIN SERPL BCP-MCNC: 4.4 G/DL (ref 3.5–5)
ALP SERPL-CCNC: 83 U/L (ref 46–116)
ALT SERPL W P-5'-P-CCNC: 46 U/L (ref 12–78)
ANION GAP SERPL CALCULATED.3IONS-SCNC: 10 MMOL/L (ref 4–13)
AST SERPL W P-5'-P-CCNC: 25 U/L (ref 5–45)
BACTERIA UR QL AUTO: ABNORMAL /HPF
BASOPHILS # BLD AUTO: 0.01 THOUSANDS/ΜL (ref 0–0.1)
BASOPHILS NFR BLD AUTO: 0 % (ref 0–1)
BILIRUB SERPL-MCNC: 0.26 MG/DL (ref 0.2–1)
BILIRUB UR QL STRIP: NEGATIVE
BUN SERPL-MCNC: 15 MG/DL (ref 5–25)
CALCIUM SERPL-MCNC: 9.5 MG/DL (ref 8.3–10.1)
CHLORIDE SERPL-SCNC: 102 MMOL/L (ref 100–108)
CLARITY UR: CLEAR
CO2 SERPL-SCNC: 28 MMOL/L (ref 21–32)
COLOR UR: YELLOW
CREAT SERPL-MCNC: 0.95 MG/DL (ref 0.6–1.3)
EOSINOPHIL # BLD AUTO: 0.23 THOUSAND/ΜL (ref 0–0.61)
EOSINOPHIL NFR BLD AUTO: 4 % (ref 0–6)
ERYTHROCYTE [DISTWIDTH] IN BLOOD BY AUTOMATED COUNT: 13.1 % (ref 11.6–15.1)
GFR SERPL CREATININE-BSD FRML MDRD: 96 ML/MIN/1.73SQ M
GLUCOSE SERPL-MCNC: 96 MG/DL (ref 65–140)
GLUCOSE UR STRIP-MCNC: NEGATIVE MG/DL
HCT VFR BLD AUTO: 43.9 % (ref 36.5–49.3)
HGB BLD-MCNC: 15 G/DL (ref 12–17)
HGB UR QL STRIP.AUTO: ABNORMAL
KETONES UR STRIP-MCNC: NEGATIVE MG/DL
LEUKOCYTE ESTERASE UR QL STRIP: NEGATIVE
LIPASE SERPL-CCNC: 180 U/L (ref 73–393)
LYMPHOCYTES # BLD AUTO: 2.54 THOUSANDS/ΜL (ref 0.6–4.47)
LYMPHOCYTES NFR BLD AUTO: 42 % (ref 14–44)
MCH RBC QN AUTO: 28.9 PG (ref 26.8–34.3)
MCHC RBC AUTO-ENTMCNC: 34.2 G/DL (ref 31.4–37.4)
MCV RBC AUTO: 85 FL (ref 82–98)
MONOCYTES # BLD AUTO: 0.38 THOUSAND/ΜL (ref 0.17–1.22)
MONOCYTES NFR BLD AUTO: 6 % (ref 4–12)
NEUTROPHILS # BLD AUTO: 2.91 THOUSANDS/ΜL (ref 1.85–7.62)
NEUTS SEG NFR BLD AUTO: 48 % (ref 43–75)
NITRITE UR QL STRIP: NEGATIVE
NON-SQ EPI CELLS URNS QL MICRO: ABNORMAL /HPF
NRBC BLD AUTO-RTO: 0 /100 WBCS
PH UR STRIP.AUTO: 5.5 [PH] (ref 4.5–8)
PLATELET # BLD AUTO: 191 THOUSANDS/UL (ref 149–390)
PMV BLD AUTO: 10.7 FL (ref 8.9–12.7)
POTASSIUM SERPL-SCNC: 4.1 MMOL/L (ref 3.5–5.3)
PROT SERPL-MCNC: 8.3 G/DL (ref 6.4–8.2)
PROT UR STRIP-MCNC: NEGATIVE MG/DL
RBC # BLD AUTO: 5.19 MILLION/UL (ref 3.88–5.62)
RBC #/AREA URNS AUTO: ABNORMAL /HPF
SODIUM SERPL-SCNC: 140 MMOL/L (ref 136–145)
SP GR UR STRIP.AUTO: 1.01 (ref 1–1.03)
UROBILINOGEN UR QL STRIP.AUTO: 0.2 E.U./DL
WBC # BLD AUTO: 6.07 THOUSAND/UL (ref 4.31–10.16)
WBC #/AREA URNS AUTO: ABNORMAL /HPF

## 2018-02-27 PROCEDURE — 99284 EMERGENCY DEPT VISIT MOD MDM: CPT

## 2018-02-27 PROCEDURE — 81001 URINALYSIS AUTO W/SCOPE: CPT

## 2018-02-27 PROCEDURE — 85025 COMPLETE CBC W/AUTO DIFF WBC: CPT | Performed by: EMERGENCY MEDICINE

## 2018-02-27 PROCEDURE — 83690 ASSAY OF LIPASE: CPT | Performed by: EMERGENCY MEDICINE

## 2018-02-27 PROCEDURE — 81002 URINALYSIS NONAUTO W/O SCOPE: CPT | Performed by: EMERGENCY MEDICINE

## 2018-02-27 PROCEDURE — 80053 COMPREHEN METABOLIC PANEL: CPT | Performed by: EMERGENCY MEDICINE

## 2018-02-27 PROCEDURE — 96360 HYDRATION IV INFUSION INIT: CPT

## 2018-02-27 PROCEDURE — 36415 COLL VENOUS BLD VENIPUNCTURE: CPT | Performed by: EMERGENCY MEDICINE

## 2018-02-27 RX ORDER — SUCRALFATE ORAL 1 G/10ML
1 SUSPENSION ORAL 4 TIMES DAILY
Qty: 420 ML | Refills: 0 | Status: SHIPPED | OUTPATIENT
Start: 2018-02-27 | End: 2018-10-13

## 2018-02-27 RX ADMIN — SODIUM CHLORIDE 1000 ML: 0.9 INJECTION, SOLUTION INTRAVENOUS at 16:16

## 2018-02-27 NOTE — ED PROVIDER NOTES
History  Chief Complaint   Patient presents with    Abdominal Pain     Reporting intermittent lower abdominal pain for months  Describes the pain as sharp and cramping  Reporting nausea and diarrhea  53-year-old male presents for evaluation of intermittent lower abdominal pain over the past several months  Describes a burning sensation which comes and goes  He states that it varies in intensity  There are no modifying factors  Nothing seems to bring it on or make it go away  Patient does take an antacid for this  Patient states that he has intermittent loose stools and normal bowel movements  This is associated with nausea but no vomiting  No fevers, chills, night sweats, weight change, cough, URI symptoms, melena, hematochezia  Patient has seen GI for this and was treated medications minimal relief  Does appoint with GI next month  History provided by:  Patient  Abdominal Pain   Associated symptoms: nausea    Associated symptoms: no chest pain, no chills, no constipation, no cough, no diarrhea, no dysuria, no fatigue, no fever, no hematuria, no shortness of breath, no sore throat and no vomiting        Prior to Admission Medications   Prescriptions Last Dose Informant Patient Reported? Taking?    cyclobenzaprine (FLEXERIL) 5 mg tablet   No No   Si-2 PO TID PRN   methylprednisolone (MEDROL) 4 mg tablet   No No   Si tb po on day 1; 5 tb po on day 2; 4 tb po on day 3; 3 tb po on day 4; 2 tb po on day 5; 1 tb po on day 6      Facility-Administered Medications: None       Past Medical History:   Diagnosis Date    Depression     Hyperlipidemia        Past Surgical History:   Procedure Laterality Date    CARPAL TUNNEL RELEASE         Family History   Problem Relation Age of Onset    Hypertension Mother     Heart attack Father     Heart disease Maternal Grandmother     Hypertension Maternal Grandmother     Diabetes Maternal Grandfather     Cancer Maternal Grandfather      I have reviewed and agree with the history as documented  Social History   Substance Use Topics    Smoking status: Never Smoker    Smokeless tobacco: Never Used    Alcohol use Yes      Comment: occassional         Review of Systems   Constitutional: Negative for chills, diaphoresis, fatigue and fever  HENT: Negative for congestion, ear pain, rhinorrhea, sinus pressure, sneezing, sore throat and trouble swallowing  Eyes: Negative for pain and visual disturbance  Respiratory: Negative for cough, chest tightness, shortness of breath, wheezing and stridor  Cardiovascular: Negative for chest pain, palpitations and leg swelling  Gastrointestinal: Positive for abdominal pain and nausea  Negative for blood in stool, constipation, diarrhea and vomiting  Endocrine: Negative for polydipsia, polyphagia and polyuria  Genitourinary: Negative for decreased urine volume, dysuria, flank pain, frequency, hematuria, penile swelling, scrotal swelling and urgency  Musculoskeletal: Negative for arthralgias and myalgias  Skin: Negative for color change and rash  Neurological: Negative for dizziness, seizures, light-headedness, numbness and headaches  Hematological: Negative for adenopathy  Psychiatric/Behavioral: The patient is not nervous/anxious  Physical Exam  ED Triage Vitals   Temperature Pulse Respirations Blood Pressure SpO2   02/27/18 1427 02/27/18 1427 02/27/18 1427 02/27/18 1427 02/27/18 1427   98 2 °F (36 8 °C) 81 18 128/78 98 %      Temp Source Heart Rate Source Patient Position - Orthostatic VS BP Location FiO2 (%)   02/27/18 1427 02/27/18 1427 02/27/18 1427 02/27/18 1427 --   Oral Monitor Sitting Right arm       Pain Score       02/27/18 1708       2           Orthostatic Vital Signs  Vitals:    02/27/18 1427 02/27/18 1708   BP: 128/78 134/90   Pulse: 81 85   Patient Position - Orthostatic VS: Sitting Lying       Physical Exam   Constitutional: He is oriented to person, place, and time   He appears well-developed and well-nourished  HENT:   Mouth/Throat: No oropharyngeal exudate  TMs normal bilaterally no pharyngeal erythema no rhinorrhea nontender palpation of sinuses, normal looking turbinates   Eyes: Conjunctivae and EOM are normal    Neck: Normal range of motion  Neck supple  No meningeal signs   Cardiovascular: Normal rate, regular rhythm, normal heart sounds and intact distal pulses  Pulmonary/Chest: Effort normal and breath sounds normal  No respiratory distress  He has no wheezes  He has no rales  He exhibits no tenderness  Abdominal: Soft  Bowel sounds are normal  He exhibits no distension and no mass  There is no tenderness  No hernia  No cvat   Musculoskeletal: Normal range of motion  He exhibits no edema  Lymphadenopathy:     He has no cervical adenopathy  Neurological: He is alert and oriented to person, place, and time  No cranial nerve deficit  Skin: No rash noted  No erythema  No edema   Psychiatric: He has a normal mood and affect  His behavior is normal    Nursing note and vitals reviewed        ED Medications  Medications   sodium chloride 0 9 % bolus 1,000 mL (1,000 mL Intravenous New Bag 2/27/18 1616)       Diagnostic Studies  Results Reviewed     Procedure Component Value Units Date/Time    ED Urine Macroscopic [[id number]]  (Abnormal) Collected:  02/27/18 1718    Lab Status:  Final result Specimen:  Urine Updated:  02/27/18 1719     Color, UA Yellow     Clarity, UA Clear     pH, UA 5 5     Leukocytes, UA Negative     Nitrite, UA Negative     Protein, UA Negative mg/dl      Glucose, UA Negative mg/dl      Ketones, UA Negative mg/dl      Urobilinogen, UA 0 2 E U /dl      Bilirubin, UA Negative     Blood, UA Trace (A)     Specific [place], UA 1 010    Narrative:       CLINITEK RESULT    Urine Microscopic [[id number]] Collected:  02/27/18 1718    Lab Status:  No result Specimen:  Urine     Comprehensive metabolic panel [[id number]]  (Abnormal) Collected:  02/27/18 1615 Lab Status:  Final result Specimen:  Blood from Arm, Left Updated:  02/27/18 1648     Sodium 140 mmol/L      Potassium 4 1 mmol/L      Chloride 102 mmol/L      CO2 28 mmol/L      Anion Gap 10 mmol/L      BUN 15 mg/dL      Creatinine 0 95 mg/dL      Glucose 96 mg/dL      Calcium 9 5 mg/dL      AST 25 U/L      ALT 46 U/L      Alkaline Phosphatase 83 U/L      Total Protein 8 3 (H) g/dL      Albumin 4 4 g/dL      Total Bilirubin 0 26 mg/dL      eGFR 96 ml/min/1 73sq m     Narrative:         National Kidney Disease Education Program recommendations are as follows:  GFR calculation is accurate only with a steady state creatinine  Chronic Kidney disease less than 60 ml/min/1 73 sq  meters  Kidney failure less than 15 ml/min/1 73 sq  meters      Lipase [[id number]]  (Normal) Collected:  02/27/18 1615    Lab Status:  Final result Specimen:  Blood from Arm, Left Updated:  02/27/18 1648     Lipase 180 u/L     CBC and differential [[id number]]  (Normal) Collected:  02/27/18 1615    Lab Status:  Final result Specimen:  Blood from Arm, Left Updated:  02/27/18 1622     WBC 6 07 Thousand/uL      RBC 5 19 Million/uL      Hemoglobin 15 0 g/dL      Hematocrit 43 9 %      MCV 85 fL      MCH 28 9 pg      MCHC 34 2 g/dL      RDW 13 1 %      MPV 10 7 fL      Platelets 541 Thousands/uL      nRBC 0 /100 WBCs      Neutrophils Relative 48 %      Lymphocytes Relative 42 %      Monocytes Relative 6 %      Eosinophils Relative 4 %      Basophils Relative 0 %      Neutrophils Absolute 2 91 Thousands/µL      Lymphocytes Absolute 2 54 Thousands/µL      Monocytes Absolute 0 38 Thousand/µL      Eosinophils Absolute 0 23 Thousand/µL      Basophils Absolute 0 01 Thousands/µL     POCT urinalysis dipstick [[id number]]     Lab Status:  No result Specimen:  Urine                  No orders to display              Procedures  Procedures       Phone Contacts  ED Phone Contact    ED Course  ED Course                                MDM  Number of Diagnoses or Management Options  Diagnosis management comments: Chronic abdominal pain with benign exam-will check labs, add Carafate, reassure, counseled  CritCare Time    Disposition  Final diagnoses:   Chronic abdominal pain   Nausea     Time reflects when diagnosis was documented in both MDM as applicable and the Disposition within this note     Time User Action Codes Description Comment    2/27/2018  5:07 PM Anibal Ibrahim Add [R10 9,  G89 29] Chronic abdominal pain     2/27/2018  5:07 PM Marcus Romero Add [R11 0] Nausea       ED Disposition     None      Follow-up Information     Follow up With Specialties Details Why [address] [facility] Schedule an appointment as soon as possible for a visit in 2 days  [facility] [address] [phone]        Patient's Medications   Discharge Prescriptions    SUCRALFATE (CARAFATE) 1 G/10 ML SUSPENSION    Take 10 mL (1 g total) by mouth 4 (four) times a day for 7 days       Start Date: 2/27/2018 End Date: 3/6/2018       Order Dose: 1 g       Quantity: 420 mL    Refills: 0     No discharge procedures on file      ED Provider  Electronically Signed by           Soyla Opitz, MD  02/27/18 8704

## 2018-02-27 NOTE — DISCHARGE INSTRUCTIONS
Abdominal Pain   WHAT YOU NEED TO KNOW:   Abdominal pain can be dull, achy, or sharp  You may have pain in one area of your abdomen, or in your entire abdomen  Your pain may be caused by a condition such as constipation, food sensitivity or poisoning, infection, or a blockage  Abdominal pain can also be from a hernia, appendicitis, or an ulcer  Liver, gallbladder, or kidney conditions can also cause abdominal pain  The cause of your abdominal pain may be unknown  DISCHARGE INSTRUCTIONS:   Return to the emergency department if:   · You have new chest pain or shortness of breath  · You have pulsing pain in your upper abdomen or lower back that suddenly becomes constant  · Your pain is in the right lower abdominal area and worsens with movement  · You have a fever over 100 4°F (38°C) or shaking chills  · You are vomiting and cannot keep food or liquids down  · Your pain does not improve or gets worse over the next 8 to 12 hours  · You see blood in your vomit or bowel movements, or they look black and tarry  · Your skin or the whites of your eyes turn yellow  · You are a woman and have a large amount of vaginal bleeding that is not your monthly period  Contact your healthcare provider if:   · You have pain in your lower back  · You are a man and have pain in your testicles  · You have pain when you urinate  · You have questions or concerns about your condition or care  Follow up with your healthcare provider within 24 hours or as directed:  Write down your questions so you remember to ask them during your visits  Medicines:   · Medicines  may be given to calm your stomach and prevent vomiting or to decrease pain  Ask how to take pain medicine safely  · Take your medicine as directed  Contact your healthcare provider if you think your medicine is not helping or if you have side effects  Tell him of her if you are allergic to any medicine   Keep a list of the medicines, vitamins, and herbs you take  Include the amounts, and when and why you take them  Bring the list or the pill bottles to follow-up visits  Carry your medicine list with you in case of an emergency  © 2017 [address] Information is for End User's use only and may not be sold, redistributed or otherwise used for commercial purposes  All illustrations and images included in CareNotes® are the copyrighted property of A D A M [employer]  or Farhat Galvez  The above information is an  only  It is not intended as medical advice for individual conditions or treatments  Talk to your doctor, nurse or pharmacist before following any medical regimen to see if it is safe and effective for you

## 2018-10-13 ENCOUNTER — HOSPITAL ENCOUNTER (INPATIENT)
Facility: HOSPITAL | Age: 48
LOS: 4 days | Discharge: HOME | DRG: 751 | End: 2018-10-17
Attending: PSYCHIATRY & NEUROLOGY | Admitting: PSYCHIATRY & NEUROLOGY
Payer: COMMERCIAL

## 2018-10-13 ENCOUNTER — HOSPITAL ENCOUNTER (EMERGENCY)
Facility: HOSPITAL | Age: 48
End: 2018-10-13
Attending: EMERGENCY MEDICINE
Payer: COMMERCIAL

## 2018-10-13 VITALS
TEMPERATURE: 98.6 F | SYSTOLIC BLOOD PRESSURE: 142 MMHG | HEART RATE: 91 BPM | OXYGEN SATURATION: 96 % | BODY MASS INDEX: 26.63 KG/M2 | DIASTOLIC BLOOD PRESSURE: 98 MMHG | WEIGHT: 160 LBS | RESPIRATION RATE: 18 BRPM

## 2018-10-13 DIAGNOSIS — E78.5 HYPERLIPIDEMIA, UNSPECIFIED HYPERLIPIDEMIA TYPE: ICD-10-CM

## 2018-10-13 DIAGNOSIS — F32.2 SEVERE MAJOR DEPRESSION WITHOUT PSYCHOTIC FEATURES (HCC): ICD-10-CM

## 2018-10-13 DIAGNOSIS — F10.929 ALCOHOL INTOXICATION (HCC): ICD-10-CM

## 2018-10-13 DIAGNOSIS — F33.2 MAJOR DEPRESSIVE DISORDER, RECURRENT, SEVERE WITHOUT PSYCHOTIC FEATURES (HCC): Primary | Chronic | ICD-10-CM

## 2018-10-13 DIAGNOSIS — H93.19 TINNITUS: ICD-10-CM

## 2018-10-13 DIAGNOSIS — F32.A DEPRESSED: Primary | ICD-10-CM

## 2018-10-13 DIAGNOSIS — R45.851 SUICIDAL IDEATIONS: ICD-10-CM

## 2018-10-13 LAB
AMPHETAMINES SERPL QL SCN: NEGATIVE
BARBITURATES UR QL: NEGATIVE
BENZODIAZ UR QL: NEGATIVE
COCAINE UR QL: NEGATIVE
ETHANOL EXG-MCNC: 0.09 MG/DL
ETHANOL EXG-MCNC: 0.12 MG/DL
METHADONE UR QL: NEGATIVE
OPIATES UR QL SCN: NEGATIVE
PCP UR QL: NEGATIVE
THC UR QL: NEGATIVE

## 2018-10-13 PROCEDURE — 99284 EMERGENCY DEPT VISIT MOD MDM: CPT

## 2018-10-13 PROCEDURE — 82075 ASSAY OF BREATH ETHANOL: CPT | Performed by: EMERGENCY MEDICINE

## 2018-10-13 PROCEDURE — 99222 1ST HOSP IP/OBS MODERATE 55: CPT | Performed by: PSYCHIATRY & NEUROLOGY

## 2018-10-13 PROCEDURE — 80307 DRUG TEST PRSMV CHEM ANLYZR: CPT | Performed by: EMERGENCY MEDICINE

## 2018-10-13 PROCEDURE — 99252 IP/OBS CONSLTJ NEW/EST SF 35: CPT | Performed by: PHYSICIAN ASSISTANT

## 2018-10-13 RX ORDER — TRAZODONE HYDROCHLORIDE 50 MG/1
50 TABLET ORAL
Status: DISCONTINUED | OUTPATIENT
Start: 2018-10-13 | End: 2018-10-17 | Stop reason: HOSPADM

## 2018-10-13 RX ORDER — IBUPROFEN 600 MG/1
600 TABLET ORAL EVERY 8 HOURS PRN
Status: DISCONTINUED | OUTPATIENT
Start: 2018-10-13 | End: 2018-10-17 | Stop reason: HOSPADM

## 2018-10-13 RX ORDER — HALOPERIDOL 5 MG/ML
5 INJECTION INTRAMUSCULAR EVERY 8 HOURS PRN
Status: DISCONTINUED | OUTPATIENT
Start: 2018-10-13 | End: 2018-10-15

## 2018-10-13 RX ORDER — OLANZAPINE 5 MG/1
5 TABLET ORAL
Status: DISCONTINUED | OUTPATIENT
Start: 2018-10-13 | End: 2018-10-17 | Stop reason: HOSPADM

## 2018-10-13 RX ORDER — RISPERIDONE 1 MG/1
1 TABLET, ORALLY DISINTEGRATING ORAL
Status: DISCONTINUED | OUTPATIENT
Start: 2018-10-13 | End: 2018-10-17 | Stop reason: HOSPADM

## 2018-10-13 RX ORDER — LORAZEPAM 1 MG/1
1 TABLET ORAL EVERY 6 HOURS PRN
Status: DISCONTINUED | OUTPATIENT
Start: 2018-10-13 | End: 2018-10-15

## 2018-10-13 RX ORDER — LORAZEPAM 2 MG/ML
1 INJECTION INTRAMUSCULAR EVERY 8 HOURS PRN
Status: DISCONTINUED | OUTPATIENT
Start: 2018-10-13 | End: 2018-10-15

## 2018-10-13 RX ORDER — OLANZAPINE 10 MG/1
5 INJECTION, POWDER, LYOPHILIZED, FOR SOLUTION INTRAMUSCULAR
Status: DISCONTINUED | OUTPATIENT
Start: 2018-10-13 | End: 2018-10-15

## 2018-10-13 RX ORDER — IBUPROFEN 400 MG/1
400 TABLET ORAL EVERY 8 HOURS PRN
Status: DISCONTINUED | OUTPATIENT
Start: 2018-10-13 | End: 2018-10-15

## 2018-10-13 RX ORDER — IBUPROFEN 400 MG/1
800 TABLET ORAL EVERY 8 HOURS PRN
Status: DISCONTINUED | OUTPATIENT
Start: 2018-10-13 | End: 2018-10-15

## 2018-10-13 RX ORDER — MAGNESIUM HYDROXIDE/ALUMINUM HYDROXICE/SIMETHICONE 120; 1200; 1200 MG/30ML; MG/30ML; MG/30ML
30 SUSPENSION ORAL EVERY 4 HOURS PRN
Status: DISCONTINUED | OUTPATIENT
Start: 2018-10-13 | End: 2018-10-17 | Stop reason: HOSPADM

## 2018-10-13 RX ORDER — HALOPERIDOL 5 MG
5 TABLET ORAL EVERY 8 HOURS PRN
Status: DISCONTINUED | OUTPATIENT
Start: 2018-10-13 | End: 2018-10-15

## 2018-10-13 RX ORDER — AMITRIPTYLINE HYDROCHLORIDE 50 MG/1
100 TABLET, FILM COATED ORAL
Status: DISCONTINUED | OUTPATIENT
Start: 2018-10-13 | End: 2018-10-15

## 2018-10-13 RX ADMIN — AMITRIPTYLINE HYDROCHLORIDE 100 MG: 50 TABLET, FILM COATED ORAL at 21:30

## 2018-10-13 NOTE — ED NOTES
Pt accepted to [facility]     Accepting Doctor is Dr Price Smoke by [facility] at 3:30pm
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
## 2018-10-13 NOTE — ED NOTES
Patient is currently sleeping  Dr Gaurav Sands stated she agreed patient should sleep for awhile, possibly feel a little more stable in the morning and have crisis assess once he has rested

## 2018-10-13 NOTE — EMTALA/ACUTE CARE TRANSFER
[facility]  [address] [address]  Dept: [phone]      EMTALA TRANSFER CONSENT    NAME César Lynn                                         1970                              MRN [id number]    I have been informed of my rights regarding examination, treatment, and transfer   by Dr Raquel Becerra, *    Benefits: Continuity of care    Risks: Potential for delay in receiving treatment, Increased discomfort during transfer      Consent for Transfer:  I acknowledge that my medical condition has been evaluated and explained to me by the emergency department physician or other qualified medical person and/or my attending physician, who has recommended that I be transferred to the service of  Accepting Physician: Dr Chuy Fitzpatrick  at [address] Name, [address] : [place]   The above potential benefits of such transfer, the potential risks associated with such transfer, and the probable risks of not being transferred have been explained to me, and I fully understand them  The doctor has explained that, in my case, the benefits of transfer outweigh the risks  I agree to be transferred  I authorize the performance of emergency medical procedures and treatments upon me in both transit and upon arrival at the receiving facility  Additionally, I authorize the release of any and all medical records to the receiving facility and request they be transported with me, if possible  I understand that the safest mode of transportation during a medical emergency is an ambulance and that the Hospital advocates the use of this mode of transport  Risks of traveling to the receiving facility by car, including absence of medical control, life sustaining equipment, such as oxygen, and medical personnel has been explained to me and I fully understand them      (MAGDA CORRECT BOX BELOW)  [  ]  I consent to the stated transfer and to be transported by ambulance/helicopter  [  ]  I consent to the stated transfer, but refuse transportation by ambulance and accept full responsibility for my transportation by car  I understand the risks of non-ambulance transfers and I exonerate the Hospital and its staff from any deterioration in my condition that results from this refusal     X___________________________________________    DATE  10/13/18  TIME________  Signature of patient or legally responsible individual signing on patient behalf           RELATIONSHIP TO PATIENT_________________________          Provider Certification    NAME Valliant Courts                                         1970                              MRN [id number]    A medical screening exam was performed on the above named patient  Based on the examination:    Condition Necessitating Transfer The primary encounter diagnosis was Depressed  Diagnoses of Alcohol intoxication ([facility] [address] ) and Suicidal ideations were also pertinent to this visit  Patient Condition: The patient has been stabilized such that within reasonable medical probability, no material deterioration of the patient condition or the condition of the unborn child(noy) is likely to result from the transfer    Reason for Transfer: Level of Care needed not available at this facility    Transfer Requirements: [address]    · [facility] available and qualified personnel available for treatment as acknowledged by    · Agreed to accept transfer and to provide appropriate medical treatment as acknowledged by       Dr Marylin Bolaños   · Appropriate medical records of the examination and treatment of the patient are provided at the time of transfer   [address] _______  · Transfer will be performed by qualified personnel from [facility]   and appropriate transfer equipment as required, including the use of necessary and appropriate life support measures      Provider Certification: I have examined the patient and explained the following risks and benefits of being transferred/refusing transfer to the patient/family:  General risk, such as traffic hazards, adverse weather conditions, rough terrain or turbulence, possible failure of equipment (including vehicle or aircraft), or consequences of actions of persons outside the control of the transport personnel      Based on these reasonable risks and benefits to the patient and/or the unborn child(noy), and based upon the information available at the time of the patients examination, I certify that the medical benefits reasonably to be expected from the provision of appropriate medical treatments at another medical facility outweigh the increasing risks, if any, to the individuals medical condition, and in the case of labor to the unborn child, from effecting the transfer      X____________________________________________ DATE 10/13/18        TIME_______      ORIGINAL - SEND TO MEDICAL RECORDS   COPY - SEND WITH PATIENT DURING TRANSFER

## 2018-10-13 NOTE — TREATMENT PLAN
TREATMENT PLAN REVIEW - Danica [address] 41 y o  1970 male MRN: [id number]    [address] [place] [address] Room / Bed: [address] [address] [id number] Encounter: [id number]          Admit Date/Time:  10/13/2018  4:06 PM    Treatment Team: Attending Provider: Cindi Piedra MD; Registered Nurse: Nohemi Siddiqui RN; Consulting Physician: Isamar De MD; Patient Care Technician: Becky Chacon    Diagnosis: major depression, severe    Patient Strengths/Assets: cooperative, communication skills, family ties, motivated    Patient Barriers/Limitations: difficulty adapting, limited education, low self esteem    Short Term Goals: decrease in depressive symptoms, decrease in anxiety symptoms, decrease in paranoid thoughts, improvement in insight    Long Term Goals: improvement in depression, improvement in anxiety, resolution of depressive symptoms, stabilization of mood, free of suicidal thoughts    Progress Towards Goals: starting psychiatric medications as prescribed, continue psychiatric medications as prescribed, improving, attends groups more often, participates in milieu therapy, mood is stabilizing    Recommended Treatment: medication management, patient medication education, group therapy, milieu therapy, continued Behavioral Health psychiatric evaluation/assessment process    Treatment Frequency: daily medication monitoring, group and milieu therapy daily, monitoring through interdisciplinary rounds, monitoring through weekly patient care conferences    Expected Discharge Date:  10/20/18    Discharge Plan: discharge to home, referral for outpatient medication management with a psychiatrist, referral for outpatient psychotherapy    Treatment Plan Created/Updated By: Félix Sahu MD

## 2018-10-13 NOTE — ED NOTES
Assumed care of patient at this time, pt sleeping with respiratory sounds via monitor, will continue to monitor via continuous video monitoring     Sabina Johnson RN  10/13/18 0521

## 2018-10-13 NOTE — ED NOTES
Patient states having no medical problems only depression and is currently NOT taking prescribed medications at this time     Dick Hogan RN  10/13/18 1165

## 2018-10-13 NOTE — ED NOTES
2 family members at bedside  Patient calm/cooperative  Patient refuses offer for lunch to be ordered       Cheikh Amin RN  10/13/18 8492
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
## 2018-10-13 NOTE — ED NOTES
201 signed by Dr/Pt    [facility] in [employer] faxed to Rafael for review

## 2018-10-13 NOTE — ED NOTES
Murtaza from crisis responded to page at this time, spoke with Archana Dyson charge nurse , she told me that [facility] Love is aware and will be coming to the [address] [place] [address], RN  10/13/18 4769

## 2018-10-13 NOTE — ED NOTES
Ambulance crew preparing to transfer pt   Report to [facility]     Wade Ge RN  10/13/18 7155

## 2018-10-13 NOTE — ED NOTES
Pt changed into paper scrubs at this time, belongings placed in locker #12       Beni Farris RN  10/13/18 0857

## 2018-10-13 NOTE — ED PROVIDER NOTES
History  Chief Complaint   Patient presents with    Psychiatric Evaluation     patient presents with [employer] cooperative and alert  patient admits to having a few drinks tonight  patient reports SI without specific plan, states "I'm still thinking"  patient reports SI "for a while"  denies HI   denies /  [employer] willing to petition [phone] if patient does not sign 12      40 yo male who presents very depressed, suicidal   Pt is intoxicated with ETOH 0 121  He states this is an ongoing issue with depression and suicidal thoughts but it has worsened recently  He is very tearful and asks if he can just go home and be done with this, mentions going home to take a pill and end his life  Denies drugs/ETOH  Not working, lives with signif other  When I ask him what is bothering him he states "everything" and will not discuss reasons for depression/SI  History provided by:  Patient and police   used: No    Psychiatric Evaluation   Presenting symptoms: depression, suicidal thoughts and suicidal threats    Presenting symptoms: no agitation and no hallucinations    Degree of incapacity (severity):   Moderate  Onset quality:  Gradual  Timing:  Constant  Progression:  Worsening  Chronicity:  Recurrent  Context comment:  Pt will not go into detail  Relieved by:  Nothing  Associated symptoms: anxiety, feelings of worthlessness, irritability and poor judgment    Associated symptoms: no abdominal pain, no chest pain and no headaches    Risk factors: hx of mental illness        None       Past Medical History:   Diagnosis Date    Anxiety     Depression     Hyperlipidemia     Psychiatric illness     PTSD (post-traumatic stress disorder)        Past Surgical History:   Procedure Laterality Date    CARPAL TUNNEL RELEASE         Family History   Problem Relation Age of Onset    Hypertension Mother     Heart attack Father     Heart disease Maternal Grandmother     Hypertension Maternal Grandmother  Diabetes Maternal Grandfather     Cancer Maternal Grandfather     Diabetes Paternal Grandmother     Colon cancer Paternal Grandmother      I have reviewed and agree with the history as documented  Social History   Substance Use Topics    Smoking status: Never Smoker    Smokeless tobacco: Never Used    Alcohol use Yes      Comment: occassional         Review of Systems   Constitutional: Positive for irritability  Negative for chills and fever  HENT: Negative for congestion and sore throat  Eyes: Negative for visual disturbance  Respiratory: Negative for shortness of breath and wheezing  Cardiovascular: Negative for chest pain and palpitations  Gastrointestinal: Negative for abdominal pain, diarrhea, nausea and vomiting  Genitourinary: Negative for dysuria  Musculoskeletal: Negative for neck pain and neck stiffness  Skin: Negative for pallor and rash  Neurological: Negative for seizures and headaches  Psychiatric/Behavioral: Positive for sleep disturbance and suicidal ideas  Negative for agitation, confusion, decreased concentration and hallucinations  The patient is nervous/anxious  All other systems reviewed and are negative  Physical Exam  Physical Exam   Constitutional: He is oriented to person, place, and time  He appears well-developed and well-nourished  HENT:   Head: Normocephalic and atraumatic  Mouth/Throat: Oropharynx is clear and moist    Eyes: Pupils are equal, round, and reactive to light  EOM are normal    Neck: Normal range of motion  Neck supple  Cardiovascular: Normal rate and regular rhythm  No murmur heard  Pulmonary/Chest: Effort normal and breath sounds normal    Abdominal: Soft  Bowel sounds are normal  He exhibits no distension  There is no tenderness  Musculoskeletal: Normal range of motion  He exhibits no edema  Neurological: He is alert and oriented to person, place, and time  Skin: Skin is warm   Capillary refill takes less than 2 seconds  No rash noted  No pallor  Psychiatric:   Depressed, tearful, suicidal with plan to OD   Nursing note and vitals reviewed  Vital Signs  ED Triage Vitals   Temperature Pulse Respirations Blood Pressure SpO2   10/13/18 0112 10/13/18 0112 10/13/18 0112 10/13/18 0112 10/13/18 0112   98 4 °F (36 9 °C) (!) 106 16 133/78 98 %      Temp Source Heart Rate Source Patient Position - Orthostatic VS BP Location FiO2 (%)   10/13/18 0112 10/13/18 0638 10/13/18 0112 10/13/18 0112 --   Oral Monitor Sitting Right arm       Pain Score       10/13/18 0638       No Pain           Vitals:    10/13/18 0112 10/13/18 0638 10/13/18 1448   BP: 133/78 119/80 142/98   Pulse: (!) 106 97 91   Patient Position - Orthostatic VS: Sitting Lying        Visual Acuity      ED Medications  Medications - No data to display    Diagnostic Studies  Results Reviewed     Procedure Component Value Units Date/Time    POCT alcohol breath test [[id number]]  (Abnormal) Resulted:  10/13/18 0308    Lab Status:  Final result Updated:  10/13/18 0308     EXTBreath Alcohol 0 089    Rapid drug screen, urine [[id number]]  (Normal) Collected:  10/13/18 0116    Lab Status:  Final result Specimen:  Urine from Urine, Clean Catch Updated:  10/13/18 0145     Amph/Meth UR Negative     Barbiturate Ur Negative     Benzodiazepine Urine Negative     Cocaine Urine Negative     Methadone Urine Negative     Opiate Urine Negative     PCP Ur Negative     THC Urine Negative    Narrative:         FOR MEDICAL PURPOSES ONLY  IF CONFIRMATION NEEDED PLEASE CONTACT THE LAB WITHIN 5 DAYS      Drug Screen Cutoff Levels:  AMPHETAMINE/METHAMPHETAMINES  1000 ng/mL  BARBITURATES     200 ng/mL  BENZODIAZEPINES     200 ng/mL  COCAINE      300 ng/mL  METHADONE      300 ng/mL  OPIATES      300 ng/mL  PHENCYCLIDINE     25 ng/mL  THC       50 ng/mL    POCT alcohol breath test [[id number]]  (Normal) Resulted:  10/13/18 0114    Lab Status:  Final result Updated:  10/13/18 0114     EXTBreath Alcohol 0 121                 No orders to display              Procedures  Procedures       Phone Contacts  ED Phone Contact    ED Course  ED Course as of Oct 13 2209   Sat Oct 13, 2018   0115 Pt seen and examined  40 yo male who presents very depressed, suicidal   Pt is intoxicated with ETOH 0 121  He states this is an ongoing issue with depression and suicidal thoughts but it has worsened recently  He is very tearful and asks if he can just go home and be done with this, mentions going home to take a pill and end his life  Denies drugs/ETOH  Not working, lives with signif other  When I ask him what is bothering him he states "everything" and will not discuss reasons for depression/SI  Will check RUDS and repeat ETOH in 2 hours and then have ED crisis see him     0307 ETOH  089 - will call in ED crisis for eval as pt unable to contract for safety  Still feels suicidal but keeps saying "just let me go, I want to go home"  0400 ED crisis worker Martinezleslie Skelton having same issue with pt not wanting to talk to her and telling her he wants to go home but not daryl for safety  Will wait for am ED crisis team to eval in person and address need for 201  MDM  CritCare Time    Disposition  Final diagnoses:   Depressed   Alcohol intoxication ([facility] [address] )   Suicidal ideations     Time reflects when diagnosis was documented in both MDM as applicable and the Disposition within this note     Time User Action Codes Description Comment    10/13/2018  6:14 AM Kylie Preciado Add [F32 9] Depressed     10/13/2018  6:14 AM Roseanne ABERNATHY Add [F10 929] Alcohol intoxication ([facility] [address] )     10/13/2018  6:14 AM Kylie Preciado Add [Q03 343] Suicidal ideations       ED Disposition     ED Disposition Condition Comment    Transfer to Another [facility] 296 should be transferred out to [facility] AND [facility] psych        MD Documentation      Most Recent Value   Patient Condition  The patient has been stabilized such that within reasonable medical probability, no material deterioration of the patient condition or the condition of the unborn child(noy) is likely to result from the transfer   Reason for Transfer  Level of Care needed not available at this facility   Benefits of Transfer  Continuity of care   Risks of Transfer  Potential for delay in receiving treatment, Increased discomfort during transfer   Accepting Physician  Dr Jamar Greco Name, [facility] by (Company and Unit #)  [facility]    Sending MD Dr Naomi Bridges    Provider Certification  General risk, such as traffic hazards, adverse weather conditions, rough terrain or turbulence, possible failure of equipment (including vehicle or aircraft), or consequences of actions of persons outside the control of the transport personnel      RN Documentation      Most [facility] Name, [facility] by [employer] and Unit #)  BACILIO       Follow-up Information    None         There are no discharge medications for this patient  No discharge procedures on file      ED Provider  Electronically Signed by           Maria Teresa Colorado DO  10/13/18 4451

## 2018-10-13 NOTE — ED NOTES
CW met with Pt  Pt was brought to ED by [employer]  Pt expressed he was having drinks with his friends then he became very depressed  Pt continue to say one minute he was drinking then the  showed up and wanted to bring him to ED to be evaluated, Pt said he is not sure who called the   Once in ED patient did expressed he wanted to take pills and end his life  Pt currently calm and cooperative  Pt states he very depressed when ask what his triggers are he stated "life" he is not working and he as a lot on his plate  Pt have a hx of major depression  Pt does not see a Psychiatrist/Therapist, Currently on no medications  Pt unable to contract for safety at this point  Currently no SI/HI no V/AH  Dr aware of this information agreeable for Pt to sign 201

## 2018-10-13 NOTE — ED NOTES
Crisis worker contacted [employer] dispatch [phone]  They started officers Starla Maddox was who dropped off this patient at [address]  Dispatch will contacted him to have him return call to crisis at [phone] when available to get more information on the situation as to how to patient came into contact with [employer] originally  Patient not cooperative with crisis intake, repeatedly asked to go home, said he was fine

## 2018-10-13 NOTE — LETTER
October 23, 2018    Fabricio Rodriguez PA-C  [address]    Patient: Leandro Oquendo   YOB: 1970   Date of Visit: 10/13/2018       Dear Dr Bruce Galeano:        Sincerely,      No name on file          CC: No Recipients

## 2018-10-13 NOTE — ED NOTES
At this time I offer breakfast to patient but he decline at this time, I try to convince him to drink some juice or have some crackers but he still decline it, also I made him aware that Murtaza from crisis is here in the department and will be going in and assess him shortly        Augustine Lesch, RN  10/13/18 2943

## 2018-10-13 NOTE — ED NOTES
Patient's nephew called requesting to speak with patient  Patient does not wish to speak with him at this time  Patient's nephew understanding and will check in on patient later        Nargis Chavez RN  10/13/18

## 2018-10-13 NOTE — CONSULTS
Medical Clearance consult H&P Exam Laquita Prieto 41 y o  male MRN: [id number]    Unit/Bed#: EP7 758-01 Encounter: [id number]    Assessment:  Depression with SI    Plan:  Admit to behavioral health  Treatment per psychiatry    History of Present Illness   This patient was transferred from the [facility] ED as a 201  He was brought there by the [employer]  The patient states that he was having drinks with his friends then he became very depressed  Someone called the police due to his depressed mood and the  showed up and wanted to bring him to ED to be evaluated  Pt said he is not sure who called the   Once in ED patient did express that he wanted to take pills and end his life  He states that he is becoming more depressed due to "life"  He is not working  He admits to having a history of major depression but he does not see a Psychiatrist/Therapist  He states that he was prescribed antidepressent medication, however, he stopped taking them because they made it difficult for him to concentrate while he drives  He is cooperative during examination today and currently denies SI/HI no V/AH  Review of Systems   Constitutional: Negative  HENT: Negative  Eyes: Negative  Respiratory: Negative  Cardiovascular: Negative  Gastrointestinal: Negative  Endocrine: Negative  Genitourinary: Negative  Musculoskeletal: Negative  Skin: Negative  Allergic/Immunologic: Negative  Neurological: Negative  Hematological: Negative  Psychiatric/Behavioral: Positive for decreased concentration, sleep disturbance and suicidal ideas         Historical Information   Past Medical History:   Diagnosis Date    Depression     Hyperlipidemia      Past Surgical History:   Procedure Laterality Date    CARPAL TUNNEL RELEASE       Social History   History   Alcohol Use    Yes     Comment: occassional      History   Drug Use No     History   Smoking Status    Never Smoker   Smokeless Tobacco    Never Used       Meds/Allergies    reviewed  No Known Allergies    Objective   First Vitals:   Blood Pressure: 142/91 (10/13/18 1615)  Pulse: (!) 111 (10/13/18 1615)  Temperature: 98 4 °F (36 9 °C) (10/13/18 1615)  Temp Source: Oral (10/13/18 1615)  Respirations: 18 (10/13/18 1615)  Height: 5' 5" (165 1 cm) (10/13/18 1615)  Weight - Scale: 63 5 kg (140 lb) (10/13/18 1615)  SpO2: 98 % (10/13/18 1615)    Current Vitals:   Blood Pressure: 142/91 (10/13/18 1615)  Pulse: (!) 111 (10/13/18 1615)  Temperature: 98 4 °F (36 9 °C) (10/13/18 1615)  Temp Source: Oral (10/13/18 1615)  Respirations: 18 (10/13/18 1615)  Height: 5' 5" (165 1 cm) (10/13/18 1615)  Weight - Scale: 63 5 kg (140 lb) (10/13/18 1615)  SpO2: 98 % (10/13/18 1615)    No intake or output data in the 24 hours ending 10/13/18 1742    Invasive Devices          No matching active lines, drains, or airways          Physical Exam   Constitutional: He is oriented to person, place, and time  He appears well-developed and well-nourished  HENT:   Head: Normocephalic and atraumatic  Right Ear: External ear normal    Left Ear: External ear normal    Nose: Nose normal    Mouth/Throat: Oropharynx is clear and moist    Eyes: Pupils are equal, round, and reactive to light  Conjunctivae and EOM are normal  Right eye exhibits no discharge  Left eye exhibits no discharge  Neck: Normal range of motion  Neck supple  No tracheal deviation present  No thyromegaly present  Cardiovascular: Normal rate, regular rhythm, normal heart sounds and intact distal pulses  No murmur heard  Pulmonary/Chest: Effort normal and breath sounds normal  No respiratory distress  He has no wheezes  Abdominal: Soft  Bowel sounds are normal  He exhibits no distension and no mass  Musculoskeletal: Normal range of motion  He exhibits no edema or deformity  Lymphadenopathy:     He has no cervical adenopathy  Neurological: He is alert and oriented to person, place, and time   He has normal reflexes  No cranial nerve deficit  Skin: Skin is warm and dry  No rash noted  He is not diaphoretic  No erythema  Psychiatric: His behavior is normal  Judgment and thought content normal    Flat affect, depressed mood   Nursing note and vitals reviewed        Lab Results: reviewed    Code Status: Level 1 - Full Code  Advance Directive and Living Will:      Power of :    POLST:      Frederick Burgos PA-C

## 2018-10-13 NOTE — ED NOTES
EVS states patient is fee for service  Patient has not have any active insurance covered in a long time per Aruna Doe      [employer] on File has been terminated since May 31, 2018 per [facility] line representative at [phone]

## 2018-10-13 NOTE — ED NOTES
Spoke with patient and ask him if he would like me to order him some breakfast he stated that he don't want anything to eat at this time, he states " I don't want anything right now I just want to go home" I told him that crisis worker will be here to evaluate him        Riki Gottron, RN  10/13/18 0275

## 2018-10-13 NOTE — ED NOTES
I offered the patient lunch at this time, patients states "im not hungry im fine" also made him aware that he will be going to [facility] in [place]  by ambulance at 3:30pm today     Lynn Gonzalez RN  10/13/18 0363

## 2018-10-13 NOTE — ED NOTES
Patient given water and pretzels  Visitors remain at bedside  Patient calm and cooperative       Leoncio Paige RN  10/13/18 0720

## 2018-10-14 LAB
ALBUMIN SERPL BCP-MCNC: 3.6 G/DL (ref 3.5–5)
ALP SERPL-CCNC: 86 U/L (ref 46–116)
ALT SERPL W P-5'-P-CCNC: 56 U/L (ref 12–78)
ANION GAP SERPL CALCULATED.3IONS-SCNC: 6 MMOL/L (ref 4–13)
AST SERPL W P-5'-P-CCNC: 25 U/L (ref 5–45)
BASOPHILS # BLD AUTO: 0.03 THOUSANDS/ΜL (ref 0–0.1)
BASOPHILS NFR BLD AUTO: 1 % (ref 0–1)
BILIRUB SERPL-MCNC: 0.31 MG/DL (ref 0.2–1)
BILIRUB UR QL STRIP: NEGATIVE
BUN SERPL-MCNC: 15 MG/DL (ref 5–25)
CALCIUM SERPL-MCNC: 9 MG/DL (ref 8.3–10.1)
CHLORIDE SERPL-SCNC: 107 MMOL/L (ref 100–108)
CLARITY UR: NORMAL
CO2 SERPL-SCNC: 26 MMOL/L (ref 21–32)
COLOR UR: YELLOW
CREAT SERPL-MCNC: 1.03 MG/DL (ref 0.6–1.3)
EOSINOPHIL # BLD AUTO: 0.22 THOUSAND/ΜL (ref 0–0.61)
EOSINOPHIL NFR BLD AUTO: 4 % (ref 0–6)
ERYTHROCYTE [DISTWIDTH] IN BLOOD BY AUTOMATED COUNT: 13.7 % (ref 11.6–15.1)
FOLATE SERPL-MCNC: 15.4 NG/ML (ref 3.1–17.5)
GFR SERPL CREATININE-BSD FRML MDRD: 87 ML/MIN/1.73SQ M
GLUCOSE P FAST SERPL-MCNC: 97 MG/DL (ref 65–99)
GLUCOSE SERPL-MCNC: 97 MG/DL (ref 65–140)
GLUCOSE UR STRIP-MCNC: NEGATIVE MG/DL
HCT VFR BLD AUTO: 45.1 % (ref 36.5–49.3)
HGB BLD-MCNC: 14.5 G/DL (ref 12–17)
HGB UR QL STRIP.AUTO: NEGATIVE
IMM GRANULOCYTES # BLD AUTO: 0.02 THOUSAND/UL (ref 0–0.2)
IMM GRANULOCYTES NFR BLD AUTO: 0 % (ref 0–2)
KETONES UR STRIP-MCNC: NEGATIVE MG/DL
LEUKOCYTE ESTERASE UR QL STRIP: NEGATIVE
LYMPHOCYTES # BLD AUTO: 1.96 THOUSANDS/ΜL (ref 0.6–4.47)
LYMPHOCYTES NFR BLD AUTO: 33 % (ref 14–44)
MCH RBC QN AUTO: 27.7 PG (ref 26.8–34.3)
MCHC RBC AUTO-ENTMCNC: 32.2 G/DL (ref 31.4–37.4)
MCV RBC AUTO: 86 FL (ref 82–98)
MONOCYTES # BLD AUTO: 0.4 THOUSAND/ΜL (ref 0.17–1.22)
MONOCYTES NFR BLD AUTO: 7 % (ref 4–12)
NEUTROPHILS # BLD AUTO: 3.23 THOUSANDS/ΜL (ref 1.85–7.62)
NEUTS SEG NFR BLD AUTO: 55 % (ref 43–75)
NITRITE UR QL STRIP: NEGATIVE
NRBC BLD AUTO-RTO: 0 /100 WBCS
PH UR STRIP.AUTO: 7 [PH] (ref 4.5–8)
PLATELET # BLD AUTO: 209 THOUSANDS/UL (ref 149–390)
PMV BLD AUTO: 10.6 FL (ref 8.9–12.7)
POTASSIUM SERPL-SCNC: 4.3 MMOL/L (ref 3.5–5.3)
PROT SERPL-MCNC: 7.4 G/DL (ref 6.4–8.2)
PROT UR STRIP-MCNC: NEGATIVE MG/DL
RBC # BLD AUTO: 5.23 MILLION/UL (ref 3.88–5.62)
SODIUM SERPL-SCNC: 139 MMOL/L (ref 136–145)
SP GR UR STRIP.AUTO: 1.01 (ref 1–1.03)
TSH SERPL DL<=0.05 MIU/L-ACNC: 0.43 UIU/ML (ref 0.36–3.74)
UROBILINOGEN UR QL STRIP.AUTO: 1 E.U./DL
VIT B12 SERPL-MCNC: 494 PG/ML (ref 100–900)
WBC # BLD AUTO: 5.86 THOUSAND/UL (ref 4.31–10.16)

## 2018-10-14 PROCEDURE — 82607 VITAMIN B-12: CPT | Performed by: PSYCHIATRY & NEUROLOGY

## 2018-10-14 PROCEDURE — 81003 URINALYSIS AUTO W/O SCOPE: CPT | Performed by: PSYCHIATRY & NEUROLOGY

## 2018-10-14 PROCEDURE — 82746 ASSAY OF FOLIC ACID SERUM: CPT | Performed by: PSYCHIATRY & NEUROLOGY

## 2018-10-14 PROCEDURE — 86592 SYPHILIS TEST NON-TREP QUAL: CPT | Performed by: PHYSICIAN ASSISTANT

## 2018-10-14 PROCEDURE — 99231 SBSQ HOSP IP/OBS SF/LOW 25: CPT | Performed by: PSYCHIATRY & NEUROLOGY

## 2018-10-14 PROCEDURE — 84443 ASSAY THYROID STIM HORMONE: CPT | Performed by: PSYCHIATRY & NEUROLOGY

## 2018-10-14 PROCEDURE — 85025 COMPLETE CBC W/AUTO DIFF WBC: CPT | Performed by: PHYSICIAN ASSISTANT

## 2018-10-14 PROCEDURE — 80053 COMPREHEN METABOLIC PANEL: CPT | Performed by: PHYSICIAN ASSISTANT

## 2018-10-14 RX ADMIN — AMITRIPTYLINE HYDROCHLORIDE 100 MG: 50 TABLET, FILM COATED ORAL at 21:24

## 2018-10-14 NOTE — PROGRESS NOTES
Patient reports being depressed but denies SI and other symptoms  Patient reported that prior to his current admission that he was looking around his house for something to harm himself with and put something around his neck but "it didn't work"  Patient is currently lying in bed  Patient is calm and cooperative

## 2018-10-14 NOTE — PROGRESS NOTES
C/O" I slept good "    Report from staff regarding this patient received and record reviewed  prior to seeing this patient   Behavior over the last 24 hours:  , I want to get discharge soon , before Tuesday "  Sleep:ok  Appetite:ok  Medication side effects:  ROS:better  Mental Status Evaluation:  Appearance:  Dressed appropraitely   Behavior:  cooperative   Speech:  normal   Mood:  depressed   Affect:  appropriate    Thought Process:  Goal directed   Thought Content:  normal   Perceptual Disturbances: Denied AV hallucination   Risk Potential: denied   Sensorium:  normal   Cognition:  intact   Consciousness:  Alert, OX3   Attention: Fair   Insight:  fair   Judgment: fair   Gait/Station: With in normal range   Motor Activity: With in normal range     Progress Toward Goals: working on current treatment goals, no changes  Made in treatment plan   Recommended Treatment: Continue with group therapy, milieu therapy and occupational therapy  Risks, benefits and possible side effects of Medications:   Risks, benefits, and possible side effects of medications explained to patient and patient verbalizes understanding        Medications:   current meds:   Current Facility-Administered Medications   Medication Dose Route Frequency    aluminum-magnesium hydroxide-simethicone (MYLANTA) 200-200-20 mg/5 mL oral suspension 30 mL  30 mL Oral Q4H PRN    amitriptyline (ELAVIL) tablet 100 mg  100 mg Oral HS    haloperidol (HALDOL) tablet 5 mg  5 mg Oral Q8H PRN    haloperidol lactate (HALDOL) injection 5 mg  5 mg Intramuscular Q8H PRN    ibuprofen (MOTRIN) tablet 400 mg  400 mg Oral Q8H PRN    ibuprofen (MOTRIN) tablet 600 mg  600 mg Oral Q8H PRN    ibuprofen (MOTRIN) tablet 800 mg  800 mg Oral Q8H PRN    LORazepam (ATIVAN) 2 mg/mL injection 1 mg  1 mg Intramuscular Q8H PRN    LORazepam (ATIVAN) tablet 1 mg  1 mg Oral Q6H PRN    magnesium hydroxide (MILK OF MAGNESIA) 400 mg/5 mL oral suspension 30 mL  30 mL Oral Daily PRN    OLANZapine (ZyPREXA) IM injection 5 mg  5 mg Intramuscular Q3H PRN    OLANZapine (ZyPREXA) tablet 5 mg  5 mg Oral Q3H PRN    risperiDONE (RisperDAL M-TABS) dispersible tablet 1 mg  1 mg Oral Q3H PRN    traZODone (DESYREL) tablet 50 mg  50 mg Oral HS PRN     Labs: NA    Assessment, Diagnosis  and Plan: continue with current meds and goals, F/U tomorrow    Counseling / Coordination of Care  Total floor / unit time spent today15 minutes  minutes  Greater than 50% of total time was spent with the patient and / or family counseling and / or coordination of care  A description of the counseling / coordination of care:      Tashi Harris MD

## 2018-10-15 PROBLEM — F41.1 GAD (GENERALIZED ANXIETY DISORDER): Chronic | Status: ACTIVE | Noted: 2018-10-15

## 2018-10-15 PROBLEM — F33.2 MAJOR DEPRESSIVE DISORDER, RECURRENT, SEVERE WITHOUT PSYCHOTIC FEATURES (HCC): Chronic | Status: ACTIVE | Noted: 2018-10-15

## 2018-10-15 PROBLEM — M54.16 LUMBAR RADICULOPATHY: Status: ACTIVE | Noted: 2017-08-15

## 2018-10-15 PROBLEM — B80 PINWORM INFECTION: Status: ACTIVE | Noted: 2017-12-07

## 2018-10-15 LAB — RPR SER QL: NORMAL

## 2018-10-15 PROCEDURE — 99232 SBSQ HOSP IP/OBS MODERATE 35: CPT | Performed by: PSYCHIATRY & NEUROLOGY

## 2018-10-15 RX ORDER — HALOPERIDOL 5 MG
5 TABLET ORAL EVERY 8 HOURS PRN
Status: DISCONTINUED | OUTPATIENT
Start: 2018-10-15 | End: 2018-10-17 | Stop reason: HOSPADM

## 2018-10-15 RX ORDER — ACETAMINOPHEN 325 MG/1
975 TABLET ORAL EVERY 6 HOURS PRN
Status: DISCONTINUED | OUTPATIENT
Start: 2018-10-15 | End: 2018-10-17 | Stop reason: HOSPADM

## 2018-10-15 RX ORDER — HALOPERIDOL 5 MG/ML
5 INJECTION INTRAMUSCULAR EVERY 8 HOURS PRN
Status: DISCONTINUED | OUTPATIENT
Start: 2018-10-15 | End: 2018-10-17 | Stop reason: HOSPADM

## 2018-10-15 RX ORDER — BENZTROPINE MESYLATE 1 MG/1
1 TABLET ORAL EVERY 6 HOURS PRN
Status: DISCONTINUED | OUTPATIENT
Start: 2018-10-15 | End: 2018-10-17 | Stop reason: HOSPADM

## 2018-10-15 RX ORDER — OLANZAPINE 10 MG/1
10 INJECTION, POWDER, LYOPHILIZED, FOR SOLUTION INTRAMUSCULAR
Status: DISCONTINUED | OUTPATIENT
Start: 2018-10-15 | End: 2018-10-17 | Stop reason: HOSPADM

## 2018-10-15 RX ORDER — ESCITALOPRAM OXALATE 5 MG/1
5 TABLET ORAL
Status: COMPLETED | OUTPATIENT
Start: 2018-10-15 | End: 2018-10-15

## 2018-10-15 RX ORDER — ACETAMINOPHEN 325 MG/1
650 TABLET ORAL EVERY 6 HOURS PRN
Status: DISCONTINUED | OUTPATIENT
Start: 2018-10-15 | End: 2018-10-17 | Stop reason: HOSPADM

## 2018-10-15 RX ORDER — ESCITALOPRAM OXALATE 10 MG/1
10 TABLET ORAL
Status: DISCONTINUED | OUTPATIENT
Start: 2018-10-16 | End: 2018-10-17 | Stop reason: HOSPADM

## 2018-10-15 RX ORDER — LORAZEPAM 1 MG/1
1 TABLET ORAL EVERY 6 HOURS PRN
Status: DISCONTINUED | OUTPATIENT
Start: 2018-10-15 | End: 2018-10-17 | Stop reason: HOSPADM

## 2018-10-15 RX ORDER — LORAZEPAM 2 MG/ML
1 INJECTION INTRAMUSCULAR EVERY 8 HOURS PRN
Status: DISCONTINUED | OUTPATIENT
Start: 2018-10-15 | End: 2018-10-17 | Stop reason: HOSPADM

## 2018-10-15 RX ORDER — BENZTROPINE MESYLATE 1 MG/ML
1 INJECTION INTRAMUSCULAR; INTRAVENOUS EVERY 6 HOURS PRN
Status: DISCONTINUED | OUTPATIENT
Start: 2018-10-15 | End: 2018-10-17 | Stop reason: HOSPADM

## 2018-10-15 RX ORDER — HYDROXYZINE 50 MG/1
50 TABLET, FILM COATED ORAL EVERY 6 HOURS PRN
Status: DISCONTINUED | OUTPATIENT
Start: 2018-10-15 | End: 2018-10-17 | Stop reason: HOSPADM

## 2018-10-15 RX ADMIN — ESCITALOPRAM 5 MG: 5 TABLET, FILM COATED ORAL at 21:07

## 2018-10-15 NOTE — CASE MANAGEMENT
Pt is a 41 yr old, , , male admitted on a 201 after being brought into the ED for depression and psychosis and SI  Pt reports that he lives with his boyfriend, Abhishek García, and Abhishek García is supportive, but sometimes doesn't understand and pt doesn't feel like he is always "listening"  Pt reports he was hospitalized once before at age 25 or 23 in [facility] shortly after moving to [address] [place] [address] [address] from [facility]  Pt reports his primary stressor is finding a job that he enjoys and that he can afford to pay bills  Pt currently works as an [employer]  and income fluctuates  Pt does not have outpt providers, but is agreeable  He goes to [facility]  for medical follow-up  Pt is uninsured and is concerned that medications will cost too much and he will not be able to afford them  Pt reports that he was previously prescribed meds for his stomach and when the doctor's office was able to get him enrolled in a Patient Assistance program, he could afford the meds but could not afford the physician visits  Pt drives himself to appts  Pt feels like people are often laughing at him and discriminate against him for being chaney and having a "feminine" voice  Pt reports this is true and denies that he is experiencing paranoia  Pt gives specific examples of experiences, mostly in the workplace, in previous jobs  Pt denies D&A use  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

## 2018-10-15 NOTE — PROGRESS NOTES
Progress Note - [id number]  [facility] Haritha 41 y o  male MRN: [id number]   Unit/Bed#: NW7 758-01 Encounter: [id number]    Behavior over the last 24 hours: some improvement  Kimber Lim states he feels less depressed and not as hopeless, but still has suicidal thoughts without plan today  Feels anxious, seems restless and slightly agitated  Limited participation in milieu  Compliant with medications  Sleep: slept better  Appetite: normal  Medication side effects: Yes - some tiredness   ROS: reports some tiredness, denies any shortness of breath, chest pain or abdominal pain    Mental Status Evaluation:    Appearance:  casually dressed   Behavior:  cooperative, restless and fidgety   Speech:  soft   Mood:  anxious, less depressed   Affect:  constricted   Thought Process:  organized, goal directed   Associations: intact associations   Thought Content:  no overt delusions   Perceptual Disturbances: no auditory hallucinations, no visual hallucinations   Risk Potential: Suicidal ideation - Yes, without plan at present, had suicidal ideation with plan to overdose on medications prior to admission  Homicidal ideation - None  Potential for aggression - No   Sensorium:  oriented to person, place and time/date   Memory:  recent and remote memory grossly intact   Consciousness:  alert and awake   Attention: decreased concentration and decreased attention span   Insight:  improving and moderate   Judgment: improving and moderate   Gait/Station: normal gait/station and normal balance   Motor Activity: no abnormal movements     Vital signs in last 24 hours:    Temp:  [97 6 °F (36 4 °C)-98 °F (36 7 °C)] 98 °F (36 7 °C)  HR:  [66-84] 84  Resp:  [16] 16  BP: (114-141)/(77-89) 114/77    Laboratory results:  I have personally reviewed all pertinent laboratory/tests results      Most Recent Labs:   Lab Results   Component Value Date    WBC 5 86 10/14/2018    RBC 5 23 10/14/2018    HGB 14 5 10/14/2018    HCT 45 1 10/14/2018     10/14/2018    RDW 13 7 10/14/2018    NEUTROABS 3 23 10/14/2018     10/14/2018    K 4 3 10/14/2018     10/14/2018    CO2 26 10/14/2018    BUN 15 10/14/2018    CREATININE 1 03 10/14/2018    GLUC 97 10/14/2018    GLUF 97 10/14/2018    CALCIUM 9 0 10/14/2018    AST 25 10/14/2018    ALT 56 10/14/2018    ALKPHOS 86 10/14/2018    TP 7 4 10/14/2018    ALB 3 6 10/14/2018    TBILI 0 31 10/14/2018    ZCJ0GGTMEYYH 0 426 10/14/2018   Drug Screen:   Lab Results   Component Value Date    AMPMETHUR Negative 10/13/2018    BARBTUR Negative 10/13/2018    BDZUR Negative 10/13/2018    THCUR Negative 10/13/2018    COCAINEUR Negative 10/13/2018    METHADONEUR Negative 10/13/2018    OPIATEUR Negative 10/13/2018    PCPUR Negative 10/13/2018       Progress Toward Goals: some progress, still anxious, less depressed, working on coping skills    Assessment/Plan   Principal Problem:    Major depressive disorder, recurrent, severe without psychotic features ([facility] [address] )  Active Problems:    FERNANDA (generalized anxiety disorder)    Recommended Treatment:     Planned medication and treatment changes: All current active medications have been reviewed    Encourage group therapy, milieu therapy and occupational therapy  Behavioral Health checks every 5 minutes  Check lipid profile and RPR in the morning - not done on admission  Discontinue Elavil due to tiredness; also lethal in case of overdose  Start Lexapro 5 mg at bedtime and titrate dose to help with depressive symptoms    Continue all other medications    Current Facility-Administered Medications:  acetaminophen 650 mg Oral Q6H PRN Casper Cruz MD   acetaminophen 975 mg Oral Q6H PRN Casper Cruz MD   aluminum-magnesium hydroxide-simethicone 30 mL Oral Q4H PRN Oralia Palacios PA-C   benztropine 1 mg Intramuscular Q6H PRN Casper Cruz MD   benztropine 1 mg Oral Q6H PRN Casper Cruz MD   [START ON 10/16/2018] escitalopram 10 mg Oral HS [place] Tuyet Flaherty MD   escitalopram 5 mg Oral HS Moo Fails, MD   haloperidol 5 mg Oral Q8H PRN Moo Fails, MD   haloperidol lactate 5 mg Intramuscular Q8H PRN Moo Fails, MD   hydrOXYzine HCL 50 mg Oral Q6H PRN Moo Fails, MD   ibuprofen 600 mg Oral Q8H PRN Oralia Fitzpatricke, PA-C   LORazepam 1 mg Intramuscular Q8H PRN Moo Fails, MD   LORazepam 1 mg Oral Q6H PRN Moo Fails, MD   magnesium hydroxide 30 mL Oral Daily PRN Oralia Fitzpatricke, PA-C   OLANZapine 10 mg Intramuscular Q3H PRN Moo Fails, MD   OLANZapine 5 mg Oral Q3H PRN Oralia Fitzpatricke, PA-C   risperiDONE 1 mg Oral Q3H PRN Oralia Fitzpatricke, PA-C   traZODone 50 mg Oral HS PRN Oralia Fitzpatricke, PA-C       Risks / Benefits of Treatment:    Risks, benefits, and possible side effects of medications explained to patient and patient verbalizes understanding and agreement for treatment  Counseling / Coordination of Care: Total floor / unit time spent today 35 minutes  Greater than 50% of total time was spent with the patient and / or family counseling and / or coordination of care  A description of counseling / coordination of care:     Patient's progress discussed with staff in treatment team meeting  Medications, treatment progress and treatment plan reviewed with patient  Medication changes reviewed with Char with father's illness, financial problems and job stress reviewed with patient  Coping strategies including acquiring relapse prevention skills and reducing time spent worrying reviewed with patient  Reassurance and supportive therapy provided      Yisel Smith MD 10/15/18

## 2018-10-15 NOTE — PROGRESS NOTES
Pt is meal compliant  Pt denies all psychiatric symptoms at this time  Pt is visual in the milieu; no interaction w/peers at this time  Continue to monitor
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
## 2018-10-16 ENCOUNTER — TELEPHONE (OUTPATIENT)
Dept: FAMILY MEDICINE CLINIC | Facility: CLINIC | Age: 48
End: 2018-10-16

## 2018-10-16 LAB
CHOLEST SERPL-MCNC: 260 MG/DL (ref 50–200)
HDLC SERPL-MCNC: 47 MG/DL (ref 40–60)
LDLC SERPL CALC-MCNC: 159 MG/DL (ref 0–100)
NONHDLC SERPL-MCNC: 213 MG/DL
RPR SER QL: NORMAL
TRIGL SERPL-MCNC: 270 MG/DL

## 2018-10-16 PROCEDURE — 86592 SYPHILIS TEST NON-TREP QUAL: CPT | Performed by: PSYCHIATRY & NEUROLOGY

## 2018-10-16 PROCEDURE — 99232 SBSQ HOSP IP/OBS MODERATE 35: CPT | Performed by: PSYCHIATRY & NEUROLOGY

## 2018-10-16 PROCEDURE — 80061 LIPID PANEL: CPT | Performed by: PSYCHIATRY & NEUROLOGY

## 2018-10-16 RX ADMIN — ESCITALOPRAM OXALATE 10 MG: 10 TABLET ORAL at 21:42

## 2018-10-16 NOTE — MEDICAL STUDENT
Progress Note - Behavioral Health   Lloyd Bey 41 y o  male MRN: [id number]  Unit/Bed#: NK3 758-01 Encounter: [id number]    Assessment/Plan   Principal Problem:    Major depressive disorder, recurrent, severe without psychotic features ([address] )  Active Problems:    FERNANDA (generalized anxiety disorder)    Plan:  Increase Lexapro to 10 mg qHS  Continue to encourage group therapy  Inpatient consult to SOD for hyperlipidemia and tinnitus    Subjective:  Patient states he is feeling better today, he is feeling less depressed  He states he is feeling somewhat worried about getting discharged from the hospital and going back home because he does not know how his boyfriend will react to his recent hospitalization  He says his boyfriend has always been supportive in the past  They have been talking while he has been in the hospital and things have been good between them  He denies any suicidal or homicidal ideations  He denies any auditory or visual hallucinations  Behavior over the last 24 hours:  improving  Sleep: normal  Appetite: normal  Medication side effects: No, denies any nausea, vomiting, diarrhea, headaches  ROS: Patient states he has a buzzing noise in both ears    Mental Status Evaluation:  Appearance:  age appropriate and casually dressed   Behavior:  calm and cooperative   Speech:  normal pitch and normal volume   Mood:  Slightly anxious   Affect:  mood-congruent   Thought Process:  goal directed and logical   Thought Content:  No delusions   Perceptual Disturbances: None   Risk Potential: Denies any suicidal or homicidal thoughts, plans or intent at this time   Sensorium:  person, place, time/date and situation   Cognition:  grossly intact   Consciousness:  alert and awake    Attention: attention span and concentration were age appropriate   Insight:  fair   Judgment: fair   Gait/Station: normal gait/station   Motor Activity: no abnormal movements     Progress Toward Goals:  Anxiety and depression improving since admission    Recommended Treatment: Continue with group therapy, milieu therapy and occupational therapy  Risks, benefits and possible side effects of Medications:   Risks, benefits, and possible side effects of medications explained to patient and patient verbalizes understanding  Medications: planned medication changes: Increase Lexapro to 10 mg qHS today      Labs:   Lab Results   Component Value Date    CHOL 278 11/25/2013     Lab Results   Component Value Date    HDL 47 10/16/2018    HDL 72 11/25/2013     Lab Results   Component Value Date    LDLCALC 159 (H) 10/16/2018    LDLCALC 172 11/25/2013     Lab Results   Component Value Date    TRIG 270 (H) 10/16/2018    TRIG 168 11/25/2013

## 2018-10-16 NOTE — PROGRESS NOTES
Pt is out in the milieu  Pt looks depressed  Pt states that he is a little anxious about going home  States that he is worried about his boyfriend being upset with him  Pt denies SI, but states that he hopes that those thoughts"don't cross his mind" again  Pt states that he needs to keep busy so he isn't thinking about his stressors  Pt is out and attending groups

## 2018-10-16 NOTE — DISCHARGE INSTR - OTHER ORDERS
[facility] Crisis Phone Number : [phone]  If you have no insurance for outpatient Mental Health services you will need to have a   liability appointment with [facility] to assess you to qualify for funding  [facility] does NOT provide funding for Medications      WALK IN HOURS:    Monday, Tuesday, Thursday  - 9:00am  11:30am  Wednesday  9:00am  11:30am and 1:00pm  3:00pm    Our address: [facility], [address] [place] [address], [facility], [address] [place] [address] Road   (Red brick building diagonally across from the new [employer] arena-entrance on [facility])  Our phone number: [phone]    To be seen during walk in hours, you must bring:  *** Photo identification  *** Social Security Card (if you do not have your SS card, then please bring something else                                              with your name and address listed on it )    If you have INCOME and do not have services through medical assistance (which may include food stamps, OB/GYN coverage, etc ) you must also bring:   Proof of income:  *** Current paystub from employer  *** SSD/SSI letter for the current year  or copy of bank statement  *** Unemployment compensation statement  *** Interest or dividends from banking accounts    If your net income is over $10,080, you must also bring:  *** Doctor/Hospital Bills that are paid or unpaid from within the last year  *** Pharmacy print out of prescriptions paid for the past year  *** Child support, alimony  *** Real Estate tax statement        Sincerely,     [facility] [address]

## 2018-10-16 NOTE — CASE MANAGEMENT
Writer spoke with Zuleyma Morales, pt's significant other, who states that he has been in a relationship with the patient for over 5 years and he describes the pt, at baseline, as somewhat of a pessimist, however, reports that with patient's stomach issues, and lack of resolution to his symptoms, as well as difficulty with finances, the patient has been declining over the past year and significantly over the last month with regard to his depression, hopelessness and negativity  Justyna Huddleston states he is supportive and believes patient will benefit greatly from being on an antidepressant  He also hopes patient is agreeable to a referral for an ICM, as he states patient becomes overwhelmed at times and does not want to ask for assistance  He also states he believes that if pt had a proper diagnosis and tx of his stomach issues that pt would be in a much better place, however, pt has had multiple tests, seen multiple physicians, taken multiple meds and still has not felt better  Justyna Huddleston will be available to pick pt up tomorrow either before 1pm or after 7pm (He has to work in between)  He is agreeable to patient discharging and that pt will return home to live with him as he did before  Justyna Huddleston confirmed there are no firearms in the home

## 2018-10-16 NOTE — CASE MANAGEMENT
Pt agreeable to [facility] referral  YOSSI signed  Referral completed and faxed to [facility]

## 2018-10-16 NOTE — PROGRESS NOTES
Progress Note - 54743  [facility] Haritha 41 y o  male MRN: [id number]   Unit/Bed#: NW7 758-01 Encounter: [id number]    Behavior over the last 24 hours: slowly improving  Laurence Hernandez states he feels less depressed and less anxious, denies suicidal thoughts today  Has slightly brighter affect  Compliant with medications  Attends some groups  Sleep: slept off and on  Appetite: normal  Medication side effects: No - tiredness has resolved   ROS: reports tinnitus, tiredness is resolved, denies any chest pain or abdominal pain    Mental Status Evaluation:    Appearance:  casually dressed   Behavior:  cooperative   Speech:  soft   Mood:  less anxious, less depressed   Affect:  slightly brighter   Thought Process:  organized, goal directed   Associations: intact associations   Thought Content:  no overt delusions   Perceptual Disturbances: no auditory hallucinations, no visual hallucinations   Risk Potential: Suicidal ideation - None at present  Homicidal ideation - None  Potential for aggression - No   Sensorium:  oriented to person, place and time/date   Memory:  recent and remote memory grossly intact   Consciousness:  alert and awake   Attention: attention span and concentration are improving   Insight:  moderate   Judgment: moderate   Gait/Station: normal gait/station and normal balance   Motor Activity: no abnormal movements     Vital signs in last 24 hours:    Temp:  [98 °F (36 7 °C)-98 1 °F (36 7 °C)] 98 1 °F (36 7 °C)  HR:  [] 83  Resp:  [16-18] 18  BP: (114-132)/(86-89) 132/89    Laboratory results:  I have personally reviewed all pertinent laboratory/tests results      Lipid Profile:   Lab Results   Component Value Date    CHOLESTEROL 260 (H) 10/16/2018    HDL 47 10/16/2018    TRIG 270 (H) 10/16/2018    LDLCALC 159 (H) 10/16/2018    [place] 213 10/16/2018   RPR:   Lab Results   Component Value Date    RPR Non-Reactive 10/14/2018       Progress Toward Goals: progressing, less anxious, less depressed, not suicidal today, working on coping skills, discharge planning    Assessment/Plan   Principal Problem:    Major depressive disorder, recurrent, severe without psychotic features ([facility] [address] )  Active Problems:    FERNANDA (generalized anxiety disorder)    Recommended Treatment:     Planned medication and treatment changes: All current active medications have been reviewed  Encourage group therapy, milieu therapy and occupational therapy  Behavioral Health checks every 5 minutes  Possible discharge in 1 or 2 days if continues to improve  Consult medical service due to elevated lipids and tinnitus  Increase Lexapro to 10 mg at bedtime    Continue current medications:    Current Facility-Administered Medications:  acetaminophen 650 mg Oral Q6H PRN Rachele Ballard, MD   acetaminophen 975 mg Oral Q6H PRN Rachele Ballard, MD   aluminum-magnesium hydroxide-simethicone 30 mL Oral Q4H PRN Oralia Palacios PA-C   benztropine 1 mg Intramuscular Q6H PRN Rachele Ballard, MD   benztropine 1 mg Oral Q6H PRN Rachele Ballard, MD   escitalopram 10 mg Oral HS Rachele Ballard, MD   haloperidol 5 mg Oral Q8H PRN Rachele Ballard, MD   haloperidol lactate 5 mg Intramuscular Q8H PRN Rachele Ballard, MD   hydrOXYzine HCL 50 mg Oral Q6H PRN Rachele Ballard, MD   ibuprofen 600 mg Oral Q8H PRN Oralia Palacios PA-C   LORazepam 1 mg Intramuscular Q8H PRN Rachele Ballard, MD   LORazepam 1 mg Oral Q6H PRN Rachele Ballard, MD   magnesium hydroxide 30 mL Oral Daily PRN HERNANDEZ McintoshC   OLANZapine 10 mg Intramuscular Q3H PRN Rachele Ballard, MD   OLANZapine 5 mg Oral Q3H PRN Oralia Palacios, PA-C   risperiDONE 1 mg Oral Q3H PRN Oralia Palacios, PA-C   traZODone 50 mg Oral HS PRN GALINA Mcintosh-ELMER       Risks / Benefits of Treatment:    Risks, benefits, and possible side effects of medications explained to patient and patient verbalizes understanding and agreement for treatment      Counseling / Coordination of Care:    Patient's progress discussed with staff in treatment team meeting  Medications, treatment progress and treatment plan reviewed with patient  Discharge plan discussed with patient      Parveen Ellis MD 10/16/18

## 2018-10-16 NOTE — PROGRESS NOTES
Patient has been in the milieu more this evening and has been initiating conversation with peers and staff  He had the first dose of Lexapro at HS  He said he sometimes has difficulty sleeping but decided he would like to try to sleep and if he cannot sleep he will come to staff and request a sleeping pill at that time

## 2018-10-17 VITALS
DIASTOLIC BLOOD PRESSURE: 86 MMHG | SYSTOLIC BLOOD PRESSURE: 135 MMHG | WEIGHT: 140 LBS | OXYGEN SATURATION: 98 % | HEIGHT: 65 IN | RESPIRATION RATE: 16 BRPM | TEMPERATURE: 98.1 F | BODY MASS INDEX: 23.32 KG/M2 | HEART RATE: 84 BPM

## 2018-10-17 PROCEDURE — 99239 HOSP IP/OBS DSCHRG MGMT >30: CPT | Performed by: PSYCHIATRY & NEUROLOGY

## 2018-10-17 RX ORDER — ESCITALOPRAM OXALATE 10 MG/1
10 TABLET ORAL
Qty: 30 TABLET | Refills: 1 | Status: SHIPPED | OUTPATIENT
Start: 2018-10-17 | End: 2018-12-16

## 2018-10-17 NOTE — DISCHARGE INSTR - ACTIVITY
Contact Information: If you have any questions, concerns, pended studies, tests and/or procedures, or emergencies regarding your inpatient behavioral health visit  Please contact [facility] [phone] and ask to speak to a , nurse or physician  A contact is available 24 hours/ 7 days a week at this number  Summary of Procedures Performed During your Stay:  Below is a list of major procedures performed during your hospital stay and a summary of results:  - No major procedures performed  Pending Studies     None        If studies are pending at discharge, follow up with your PCP and/or referring provider
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
## 2018-10-17 NOTE — PROGRESS NOTES
Lexapro increased to 10mgs tonight  He denies suicidal ideas, still some issues he is worried about re finances and support to be addressed once he is back home  He has been in the milieu all evening and involved

## 2018-10-17 NOTE — NURSING NOTE
Discharge instructions and discharge medication/script/goodrx card reviewed with patient  He has a copy of AV[place] in [place] and [place] [facility] [place]  He agree to keep his appointments and fill his script  He feels he has had a good experience here and is ready for discharge  His significant other had arrived to take him home and he left in his company

## 2018-10-17 NOTE — PROGRESS NOTES
Pt is out in the milieu  Pt denies SI  Pt encouraged to follow up at medical clinic in [facility] for high cholesterol  Pt asking about who he would call if he starts to re- develop the symptoms  Pt made aware that crisis number and suicide hotline number would be on d/c papers  Pt is looking forward to going home and seeing his dog

## 2018-10-18 ENCOUNTER — TRANSITIONAL CARE MANAGEMENT (OUTPATIENT)
Dept: FAMILY MEDICINE CLINIC | Facility: CLINIC | Age: 48
End: 2018-10-18

## 2018-10-22 PROBLEM — R45.851 SUICIDAL IDEATION: Status: ACTIVE | Noted: 2018-10-22

## 2018-10-25 ENCOUNTER — OFFICE VISIT (OUTPATIENT)
Dept: FAMILY MEDICINE CLINIC | Facility: CLINIC | Age: 48
End: 2018-10-25
Payer: COMMERCIAL

## 2018-10-25 VITALS
HEART RATE: 88 BPM | OXYGEN SATURATION: 97 % | RESPIRATION RATE: 18 BRPM | DIASTOLIC BLOOD PRESSURE: 80 MMHG | BODY MASS INDEX: 24.66 KG/M2 | WEIGHT: 148 LBS | TEMPERATURE: 97.6 F | SYSTOLIC BLOOD PRESSURE: 120 MMHG | HEIGHT: 65 IN

## 2018-10-25 DIAGNOSIS — K21.9 GASTROESOPHAGEAL REFLUX DISEASE, ESOPHAGITIS PRESENCE NOT SPECIFIED: ICD-10-CM

## 2018-10-25 DIAGNOSIS — F33.2 MAJOR DEPRESSIVE DISORDER, RECURRENT, SEVERE WITHOUT PSYCHOTIC FEATURES (HCC): Primary | Chronic | ICD-10-CM

## 2018-10-25 DIAGNOSIS — R45.851 SUICIDAL IDEATION: ICD-10-CM

## 2018-10-25 PROCEDURE — 99214 OFFICE O/P EST MOD 30 MIN: CPT | Performed by: PHYSICIAN ASSISTANT

## 2018-10-25 RX ORDER — FAMOTIDINE 20 MG/1
20 TABLET, FILM COATED ORAL DAILY
Qty: 90 TABLET | Refills: 0 | Status: SHIPPED | OUTPATIENT
Start: 2018-10-25 | End: 2018-12-13 | Stop reason: SDUPTHER

## 2018-10-25 NOTE — PROGRESS NOTES
Assessment/Plan:     Hospital record has been reviewed  List given of psychiatric facilities so he can reach out to his friend who resides with him    Patient Instructions   Follow-up with counseling tomorrow as scheduled, Psychiatry as scheduled next week with [address]  crisis intervention phone number if needed  Go to the ER with any increased suicidal ideations  Take Lexapro in the morning rather than at bedtime to see if reflux symptoms resolve    Start Famotidine 20 mg daily  Avoid alcohol, NSAIDs, spicy, acidic, citrus foods  Gave a Flyer regarding the flu Clinic with [facility] on November 3rd  Follow-up here in 1 month    [employer] software was used to dictate this note  It may contain errors with dictating incorrect words/spelling  Please contact provider directly for any questions  Diagnoses and all orders for this visit:    Major depressive disorder, recurrent, severe without psychotic features ([facility] [address] )    Suicidal ideation    Gastroesophageal reflux disease, esophagitis presence not specified  -     famotidine (PEPCID) 20 mg tablet; Take 1 tablet (20 mg total) by mouth daily         Subjective:     Patient ID: César Lynn is a 41 y o  male  Patient presents today for follow-up from a recent hospital admission for major depressive disorder  He states he would still has suicidal ideations but no plan to harm himself  He will see the counselor for the 1st time tomorrow at [facility]  He has an appointment with Psychiatry next week  He does have the phone number for crisis intervention but has not needed it  He continues on his Lexapro 10 mg at bedtime  He feels as though the Lexapro may be causing some reflux symptoms/burning in his chest at bedtime  He states he has always had abdominal pain for at least the last 6-7 months and has seen a GI specialist without much success with resolution of his pain  He states the reflux symptoms are new    He does notice intermittent nausea but denies any vomiting or changes in his bowels including blood  Patient denies any NSAID use  He states he only drinks alcohol on Saturdays  Denies any other substance abuse  He continues to live with his significant other who is concerned about because of daily alcohol use  He states he has not reached out to him to try to get him help  Review of Systems   Constitutional: Negative  Gastrointestinal:        As stated in HPI   Psychiatric/Behavioral:        As stated in HPI         Objective:     Physical Exam   Constitutional: He appears well-developed and well-nourished  No distress  HENT:   Head: Normocephalic and atraumatic  Right Ear: External ear normal    Left Ear: External ear normal    Nose: Nose normal    Mouth/Throat: Oropharynx is clear and moist    Neck: Neck supple  Cardiovascular: Normal rate, regular rhythm and normal heart sounds  No murmur heard  Pulmonary/Chest: Effort normal and breath sounds normal  No respiratory distress  He has no wheezes  He has no rales  Abdominal: Soft  Bowel sounds are normal  There is tenderness (Mild tenderness in the epigastric region  )  Lymphadenopathy:     He has no cervical adenopathy  Vitals:    10/25/18 1312   BP: 120/80   Pulse: 88   Resp: 18   Temp: 97 6 °F (36 4 °C)   TempSrc: Tympanic   SpO2: 97%   Weight: 67 1 kg (148 lb)   Height: 5' 5" (1 651 m)       Transitional Care Management Review:  Stephy Alvares is a 41 y o  male here for TCM follow up       During the TCM phone call patient stated:    Date and time hospital follow up call was made:  10/18/2018  2:15 PM  Hospital care reviewed:  Records reviewed  Patient was hopsitalized at:  [facility]  Date of admission:  10/13/18  Date of discharge:  10/17/18  Diagnosis:  MAJOR DEPRESSIVE DISORDER  Disposition:  Home  Were the patients medicaitons reviewed and updated:  Yes  Current symptoms:  Headache  Headache pain severity:  Mild  Should patient be enrolled in anticoag monitoring?:  No  Scheduled for follow up?:  Yes  Did you obtain your prescribed medications:  Yes  Do you need help managing your perscriptions or medications:  No  Is transportation to your appointments needed:  No  I have advised the patient to call PCP with any new or worsening symptoms (please type in name along with any credentials):  Leena Voss [facility]  Counseling:  Patient             Bimal Mac PA-C

## 2018-10-25 NOTE — PATIENT INSTRUCTIONS
Follow-up with counseling tomorrow as scheduled, Psychiatry as scheduled next week with [address]  crisis intervention phone number if needed  Go to the ER with any increased suicidal ideations  Take Lexapro in the morning rather than at bedtime to see if reflux symptoms resolve    Start Famotidine 20 mg daily  Avoid alcohol, NSAIDs, spicy, acidic, citrus foods  Gave a Flyer regarding the flu Clinic with [facility] on November 3rd  Follow-up here in 1 month

## 2018-12-13 ENCOUNTER — OFFICE VISIT (OUTPATIENT)
Dept: FAMILY MEDICINE CLINIC | Facility: CLINIC | Age: 48
End: 2018-12-13
Payer: COMMERCIAL

## 2018-12-13 VITALS
HEIGHT: 65 IN | HEART RATE: 80 BPM | BODY MASS INDEX: 24.49 KG/M2 | WEIGHT: 147 LBS | RESPIRATION RATE: 18 BRPM | DIASTOLIC BLOOD PRESSURE: 80 MMHG | SYSTOLIC BLOOD PRESSURE: 110 MMHG | TEMPERATURE: 97.2 F | OXYGEN SATURATION: 97 %

## 2018-12-13 DIAGNOSIS — H93.13 TINNITUS OF BOTH EARS: ICD-10-CM

## 2018-12-13 DIAGNOSIS — F33.2 MAJOR DEPRESSIVE DISORDER, RECURRENT, SEVERE WITHOUT PSYCHOTIC FEATURES (HCC): Primary | Chronic | ICD-10-CM

## 2018-12-13 DIAGNOSIS — K21.9 GASTROESOPHAGEAL REFLUX DISEASE, ESOPHAGITIS PRESENCE NOT SPECIFIED: ICD-10-CM

## 2018-12-13 DIAGNOSIS — E78.5 HYPERLIPIDEMIA, UNSPECIFIED HYPERLIPIDEMIA TYPE: ICD-10-CM

## 2018-12-13 PROCEDURE — 99214 OFFICE O/P EST MOD 30 MIN: CPT | Performed by: PHYSICIAN ASSISTANT

## 2018-12-13 RX ORDER — FAMOTIDINE 20 MG/1
20 TABLET, FILM COATED ORAL DAILY
Qty: 90 TABLET | Refills: 0 | Status: SHIPPED | OUTPATIENT
Start: 2018-12-13

## 2018-12-13 RX ORDER — SIMVASTATIN 20 MG
20 TABLET ORAL
Qty: 90 TABLET | Refills: 1 | Status: SHIPPED | OUTPATIENT
Start: 2018-12-13

## 2018-12-13 NOTE — PROGRESS NOTES
Assessment/Plan:    Patient Instructions   I did recommend he try increasing Lexapro from 10 mg daily up to 20 mg daily  He does have another prescription for the psychiatrist   I did advise him to call me if the medication dose with the increases effective I will send a new prescription with the 20 mg tablet  Continue follow-up with Psychiatry and counseling as scheduled  He does have another counseling appointment today    Start simvastatin 20 mg daily for elevated cholesterol  Information given about foods to choose in foods to decrease to lower his cholesterol  Recommend repeat CMP and lipid panel in 3-4 months    Continue Famotidine 20 mg daily  He will get his flu vaccine at the pharmacy  Recommend ENT referral but I did advise him he would need to call [facility] to see if they have a clinic  Routine follow-up here in 3 months    M*[employer] software was used to dictate this note  It may contain errors with dictating incorrect words/spelling  Please contact provider directly for any questions  Diagnoses and all orders for this visit:    Major depressive disorder, recurrent, severe without psychotic features ([facility] [address] )    Gastroesophageal reflux disease, esophagitis presence not specified    Hyperlipidemia, unspecified hyperlipidemia type          Subjective:      Patient ID: César Lynn is a 57 y o  male  Patient presents today for follow-up of depression  He did see Psychiatry who kept him on the Lexapro 10 mg daily  He has been seen counseling every other week  Denies any suicidal ideations at this time  He states last week he was feeling really down despite taking his Lexapro every day  At the last visit he forgot to mention that his cholesterol blood test has been done  He is concerned because he was on treatment in the past because level was really high  It has been quite a few years since he has been on medication  He did tolerate the medication well      He continues on Famotidine 20 mg daily for reflux  He states the medications have resolved his symptoms  He prefers to continue it at night for the time being  Patient states he has chronic ringing in his ears  He has never seen an ENT specialist   He denies any exposure to loud noises or utilizing headphones  He does not use aspirin products  The following portions of the patient's history were reviewed and updated as appropriate:   He  has a past medical history of Anxiety; Bilateral carpal tunnel syndrome (10/22/2015); Depression; GERD (gastroesophageal reflux disease); Head injury; Hyperlipidemia; Irritable colon; Lumbar radiculopathy; Pinworm infection; Seborrheic keratosis; and Vitamin D deficiency  He   Patient Active Problem List    Diagnosis Date Noted    Suicidal ideation 10/22/2018    [facility], [facility] 10/15/2018    FERNANDA (generalized anxiety disorder) 10/15/2018    Pinworm infection 12/07/2017    Lumbar radiculopathy 08/15/2017    GERD (gastroesophageal reflux disease) 02/18/2016    Bilateral carpal tunnel syndrome 10/22/2015    Irritable colon 01/22/2015    Seborrheic keratosis 01/22/2015    Vitamin D deficiency 12/18/2013    Hyperlipidemia 01/04/2013     He  has a past surgical history that includes Carpal tunnel release  His family history includes Cancer in his maternal grandfather; Colon cancer in his paternal grandmother; Diabetes in his maternal grandfather and paternal grandmother; Heart attack in his father; Heart disease in his maternal grandmother; Hypertension in his maternal grandmother and mother  He  reports that he has never smoked  He has never used smokeless tobacco  He reports that he drinks alcohol  He reports that he does not use drugs    Current Outpatient Prescriptions   Medication Sig Dispense Refill    escitalopram (LEXAPRO) 10 mg tablet Take 1 tablet (10 mg total) by mouth daily at bedtime for 60 days 30 tablet 1    famotidine (PEPCID) 20 mg tablet Take 1 tablet (20 mg total) by mouth daily 90 tablet 0     No current facility-administered medications for this visit  Current Outpatient Prescriptions on File Prior to Visit   Medication Sig    escitalopram (LEXAPRO) 10 mg tablet Take 1 tablet (10 mg total) by mouth daily at bedtime for 60 days    famotidine (PEPCID) 20 mg tablet Take 1 tablet (20 mg total) by mouth daily     No current facility-administered medications on file prior to visit  He has No Known Allergies       Review of Systems   Constitutional: Negative  HENT:        As stated in HPI   Gastrointestinal:        As stated in HPI   Psychiatric/Behavioral:        As stated in HPI         Objective:      /80   Pulse 80   Temp (!) 97 2 °F (36 2 °C) (Tympanic)   Resp 18   Ht 5' 5" (1 651 m)   Wt 66 7 kg (147 lb)   SpO2 97%   BMI 24 46 kg/m²          Physical Exam   Constitutional: He appears well-developed and well-nourished  No distress  HENT:   Head: Normocephalic and atraumatic  Right Ear: External ear normal    Nose: Nose normal    Mouth/Throat: Oropharynx is clear and moist    Neck: Neck supple  Cardiovascular: Normal rate, regular rhythm and normal heart sounds  No murmur heard  Pulmonary/Chest: Effort normal and breath sounds normal  No respiratory distress  He has no wheezes  He has no rales  Abdominal: Soft  Bowel sounds are normal  There is no tenderness  Lymphadenopathy:     He has no cervical adenopathy  Neurological: He is alert  Skin: Skin is warm  Psychiatric: He has a normal mood and affect

## 2018-12-13 NOTE — PATIENT INSTRUCTIONS
I did recommend he try increasing Lexapro from 10 mg daily up to 20 mg daily  He does have another prescription for the psychiatrist   I did advise him to call me if the medication dose with the increases effective I will send a new prescription with the 20 mg tablet  Continue follow-up with Psychiatry and counseling as scheduled    He does have another counseling appointment today    Start simvastatin 20 mg daily for elevated cholesterol  Information given about foods to choose in foods to decrease to lower his cholesterol  Recommend repeat CMP and lipid panel in 3-4 months    Continue Famotidine 20 mg daily  He will get his flu vaccine at the pharmacy  Recommend ENT referral but I did advise him he would need to call [facility] to see if they have a clinic  Routine follow-up here in 3 months